# Patient Record
Sex: MALE | Race: WHITE | NOT HISPANIC OR LATINO | URBAN - METROPOLITAN AREA
[De-identification: names, ages, dates, MRNs, and addresses within clinical notes are randomized per-mention and may not be internally consistent; named-entity substitution may affect disease eponyms.]

---

## 2024-06-29 ENCOUNTER — EMERGENCY (EMERGENCY)
Facility: HOSPITAL | Age: 84
LOS: 0 days | Discharge: HOME | End: 2024-06-29
Attending: STUDENT IN AN ORGANIZED HEALTH CARE EDUCATION/TRAINING PROGRAM
Payer: MEDICARE

## 2024-06-29 VITALS
OXYGEN SATURATION: 97 % | HEART RATE: 84 BPM | SYSTOLIC BLOOD PRESSURE: 177 MMHG | RESPIRATION RATE: 20 BRPM | DIASTOLIC BLOOD PRESSURE: 101 MMHG | TEMPERATURE: 101 F | HEIGHT: 70 IN | WEIGHT: 195.11 LBS

## 2024-06-29 VITALS
RESPIRATION RATE: 18 BRPM | DIASTOLIC BLOOD PRESSURE: 84 MMHG | OXYGEN SATURATION: 95 % | SYSTOLIC BLOOD PRESSURE: 161 MMHG | HEART RATE: 82 BPM

## 2024-06-29 DIAGNOSIS — R05.9 COUGH, UNSPECIFIED: ICD-10-CM

## 2024-06-29 DIAGNOSIS — Z98.890 OTHER SPECIFIED POSTPROCEDURAL STATES: Chronic | ICD-10-CM

## 2024-06-29 DIAGNOSIS — M54.50 LOW BACK PAIN, UNSPECIFIED: ICD-10-CM

## 2024-06-29 DIAGNOSIS — Z95.5 PRESENCE OF CORONARY ANGIOPLASTY IMPLANT AND GRAFT: ICD-10-CM

## 2024-06-29 DIAGNOSIS — Z20.822 CONTACT WITH AND (SUSPECTED) EXPOSURE TO COVID-19: ICD-10-CM

## 2024-06-29 DIAGNOSIS — I25.10 ATHEROSCLEROTIC HEART DISEASE OF NATIVE CORONARY ARTERY WITHOUT ANGINA PECTORIS: ICD-10-CM

## 2024-06-29 DIAGNOSIS — E78.5 HYPERLIPIDEMIA, UNSPECIFIED: ICD-10-CM

## 2024-06-29 DIAGNOSIS — Z90.89 ACQUIRED ABSENCE OF OTHER ORGANS: Chronic | ICD-10-CM

## 2024-06-29 DIAGNOSIS — N39.0 URINARY TRACT INFECTION, SITE NOT SPECIFIED: ICD-10-CM

## 2024-06-29 LAB
ALBUMIN SERPL ELPH-MCNC: 3.7 G/DL — SIGNIFICANT CHANGE UP (ref 3.3–5)
ALP SERPL-CCNC: 93 U/L — SIGNIFICANT CHANGE UP (ref 40–120)
ALT FLD-CCNC: 49 U/L — SIGNIFICANT CHANGE UP (ref 12–78)
ANION GAP SERPL CALC-SCNC: 5 MMOL/L — SIGNIFICANT CHANGE UP (ref 5–17)
APPEARANCE UR: CLEAR — SIGNIFICANT CHANGE UP
AST SERPL-CCNC: 27 U/L — SIGNIFICANT CHANGE UP (ref 15–37)
BACTERIA # UR AUTO: NEGATIVE /HPF — SIGNIFICANT CHANGE UP
BASOPHILS # BLD AUTO: 0.05 K/UL — SIGNIFICANT CHANGE UP (ref 0–0.2)
BASOPHILS NFR BLD AUTO: 0.6 % — SIGNIFICANT CHANGE UP (ref 0–2)
BILIRUB SERPL-MCNC: 0.6 MG/DL — SIGNIFICANT CHANGE UP (ref 0.2–1.2)
BILIRUB UR-MCNC: NEGATIVE — SIGNIFICANT CHANGE UP
BUN SERPL-MCNC: 13 MG/DL — SIGNIFICANT CHANGE UP (ref 7–23)
CALCIUM SERPL-MCNC: 9.5 MG/DL — SIGNIFICANT CHANGE UP (ref 8.5–10.1)
CAST: 1 /LPF — SIGNIFICANT CHANGE UP (ref 0–4)
CHLORIDE SERPL-SCNC: 106 MMOL/L — SIGNIFICANT CHANGE UP (ref 96–108)
CO2 SERPL-SCNC: 27 MMOL/L — SIGNIFICANT CHANGE UP (ref 22–31)
COLOR SPEC: YELLOW — SIGNIFICANT CHANGE UP
CREAT SERPL-MCNC: 1.07 MG/DL — SIGNIFICANT CHANGE UP (ref 0.5–1.3)
DIFF PNL FLD: ABNORMAL
EGFR: 68 ML/MIN/1.73M2 — SIGNIFICANT CHANGE UP
EGFR: 68 ML/MIN/1.73M2 — SIGNIFICANT CHANGE UP
EOSINOPHIL # BLD AUTO: 0.26 K/UL — SIGNIFICANT CHANGE UP (ref 0–0.5)
EOSINOPHIL NFR BLD AUTO: 3.1 % — SIGNIFICANT CHANGE UP (ref 0–6)
FLUAV AG NPH QL: SIGNIFICANT CHANGE UP
FLUBV AG NPH QL: SIGNIFICANT CHANGE UP
GLUCOSE SERPL-MCNC: 124 MG/DL — HIGH (ref 70–99)
GLUCOSE UR QL: NEGATIVE MG/DL — SIGNIFICANT CHANGE UP
HCT VFR BLD CALC: 42.3 % — SIGNIFICANT CHANGE UP (ref 39–50)
HGB BLD-MCNC: 14.5 G/DL — SIGNIFICANT CHANGE UP (ref 13–17)
IMM GRANULOCYTES NFR BLD AUTO: 0.2 % — SIGNIFICANT CHANGE UP (ref 0–0.9)
KETONES UR-MCNC: NEGATIVE MG/DL — SIGNIFICANT CHANGE UP
LEUKOCYTE ESTERASE UR-ACNC: ABNORMAL
LYMPHOCYTES # BLD AUTO: 1.06 K/UL — SIGNIFICANT CHANGE UP (ref 1–3.3)
LYMPHOCYTES # BLD AUTO: 12.5 % — LOW (ref 13–44)
MCHC RBC-ENTMCNC: 29.4 PG — SIGNIFICANT CHANGE UP (ref 27–34)
MCHC RBC-ENTMCNC: 34.3 GM/DL — SIGNIFICANT CHANGE UP (ref 32–36)
MCV RBC AUTO: 85.8 FL — SIGNIFICANT CHANGE UP (ref 80–100)
MONOCYTES # BLD AUTO: 0.55 K/UL — SIGNIFICANT CHANGE UP (ref 0–0.9)
MONOCYTES NFR BLD AUTO: 6.5 % — SIGNIFICANT CHANGE UP (ref 2–14)
NEUTROPHILS # BLD AUTO: 6.51 K/UL — SIGNIFICANT CHANGE UP (ref 1.8–7.4)
NEUTROPHILS NFR BLD AUTO: 77.1 % — HIGH (ref 43–77)
NITRITE UR-MCNC: NEGATIVE — SIGNIFICANT CHANGE UP
PH UR: 7.5 — SIGNIFICANT CHANGE UP (ref 5–8)
PLATELET # BLD AUTO: 215 K/UL — SIGNIFICANT CHANGE UP (ref 150–400)
POTASSIUM SERPL-MCNC: 3.9 MMOL/L — SIGNIFICANT CHANGE UP (ref 3.5–5.3)
POTASSIUM SERPL-SCNC: 3.9 MMOL/L — SIGNIFICANT CHANGE UP (ref 3.5–5.3)
PROT SERPL-MCNC: 7.4 GM/DL — SIGNIFICANT CHANGE UP (ref 6–8.3)
PROT UR-MCNC: NEGATIVE MG/DL — SIGNIFICANT CHANGE UP
RBC # BLD: 4.93 M/UL — SIGNIFICANT CHANGE UP (ref 4.2–5.8)
RBC # FLD: 13.7 % — SIGNIFICANT CHANGE UP (ref 10.3–14.5)
RBC CASTS # UR COMP ASSIST: 29 /HPF — HIGH (ref 0–4)
RSV RNA NPH QL NAA+NON-PROBE: SIGNIFICANT CHANGE UP
SARS-COV-2 RNA SPEC QL NAA+PROBE: SIGNIFICANT CHANGE UP
SODIUM SERPL-SCNC: 138 MMOL/L — SIGNIFICANT CHANGE UP (ref 135–145)
SP GR SPEC: 1.01 — SIGNIFICANT CHANGE UP (ref 1–1.03)
SQUAMOUS # UR AUTO: 1 /HPF — SIGNIFICANT CHANGE UP (ref 0–5)
UROBILINOGEN FLD QL: 0.2 MG/DL — SIGNIFICANT CHANGE UP (ref 0.2–1)
WBC # BLD: 8.45 K/UL — SIGNIFICANT CHANGE UP (ref 3.8–10.5)
WBC # FLD AUTO: 8.45 K/UL — SIGNIFICANT CHANGE UP (ref 3.8–10.5)
WBC UR QL: 37 /HPF — HIGH (ref 0–5)

## 2024-06-29 PROCEDURE — 0241U: CPT

## 2024-06-29 PROCEDURE — 80053 COMPREHEN METABOLIC PANEL: CPT

## 2024-06-29 PROCEDURE — 74177 CT ABD & PELVIS W/CONTRAST: CPT | Mod: MC

## 2024-06-29 PROCEDURE — 93010 ELECTROCARDIOGRAM REPORT: CPT

## 2024-06-29 PROCEDURE — 93005 ELECTROCARDIOGRAM TRACING: CPT

## 2024-06-29 PROCEDURE — 71045 X-RAY EXAM CHEST 1 VIEW: CPT | Mod: 26

## 2024-06-29 PROCEDURE — 96374 THER/PROPH/DIAG INJ IV PUSH: CPT | Mod: XU

## 2024-06-29 PROCEDURE — 71045 X-RAY EXAM CHEST 1 VIEW: CPT

## 2024-06-29 PROCEDURE — 85025 COMPLETE CBC W/AUTO DIFF WBC: CPT

## 2024-06-29 PROCEDURE — 87086 URINE CULTURE/COLONY COUNT: CPT

## 2024-06-29 PROCEDURE — 36415 COLL VENOUS BLD VENIPUNCTURE: CPT

## 2024-06-29 PROCEDURE — 99285 EMERGENCY DEPT VISIT HI MDM: CPT

## 2024-06-29 PROCEDURE — 81001 URINALYSIS AUTO W/SCOPE: CPT

## 2024-06-29 PROCEDURE — 74177 CT ABD & PELVIS W/CONTRAST: CPT | Mod: 26,MC

## 2024-06-29 PROCEDURE — 99285 EMERGENCY DEPT VISIT HI MDM: CPT | Mod: 25

## 2024-06-29 RX ORDER — CEFPODOXIME PROXETIL 200 MG/1
1 TABLET, FILM COATED ORAL
Qty: 14 | Refills: 0
Start: 2024-06-29 | End: 2024-07-05

## 2024-06-29 RX ORDER — METHOCARBAMOL 500 MG/1
750 TABLET, FILM COATED ORAL ONCE
Refills: 0 | Status: COMPLETED | OUTPATIENT
Start: 2024-06-29 | End: 2024-06-29

## 2024-06-29 RX ORDER — CEFTRIAXONE 500 MG/1
1000 INJECTION, POWDER, FOR SOLUTION INTRAMUSCULAR; INTRAVENOUS ONCE
Refills: 0 | Status: COMPLETED | OUTPATIENT
Start: 2024-06-29 | End: 2024-06-29

## 2024-06-29 RX ORDER — ACETAMINOPHEN 500 MG/5ML
975 LIQUID (ML) ORAL ONCE
Refills: 0 | Status: COMPLETED | OUTPATIENT
Start: 2024-06-29 | End: 2024-06-29

## 2024-06-29 RX ADMIN — CEFTRIAXONE 1000 MILLIGRAM(S): 500 INJECTION, POWDER, FOR SOLUTION INTRAMUSCULAR; INTRAVENOUS at 11:06

## 2024-06-29 RX ADMIN — METHOCARBAMOL 750 MILLIGRAM(S): 500 TABLET, FILM COATED ORAL at 09:06

## 2024-06-29 RX ADMIN — Medication 975 MILLIGRAM(S): at 09:05

## 2024-06-30 LAB
CULTURE RESULTS: SIGNIFICANT CHANGE UP
SPECIMEN SOURCE: SIGNIFICANT CHANGE UP

## 2024-11-05 ENCOUNTER — OUTPATIENT (OUTPATIENT)
Dept: OUTPATIENT SERVICES | Facility: HOSPITAL | Age: 84
LOS: 1 days | Discharge: HOME | End: 2024-11-05
Payer: MEDICARE

## 2024-11-05 VITALS
HEIGHT: 70 IN | DIASTOLIC BLOOD PRESSURE: 59 MMHG | WEIGHT: 192.68 LBS | SYSTOLIC BLOOD PRESSURE: 109 MMHG | OXYGEN SATURATION: 96 % | HEART RATE: 71 BPM | TEMPERATURE: 98 F | RESPIRATION RATE: 16 BRPM

## 2024-11-05 DIAGNOSIS — Z98.890 OTHER SPECIFIED POSTPROCEDURAL STATES: Chronic | ICD-10-CM

## 2024-11-05 DIAGNOSIS — M16.11 UNILATERAL PRIMARY OSTEOARTHRITIS, RIGHT HIP: ICD-10-CM

## 2024-11-05 DIAGNOSIS — Z01.818 ENCOUNTER FOR OTHER PREPROCEDURAL EXAMINATION: ICD-10-CM

## 2024-11-05 DIAGNOSIS — F01.50 VASCULAR DEMENTIA, UNSPECIFIED SEVERITY, WITHOUT BEHAVIORAL DISTURBANCE, PSYCHOTIC DISTURBANCE, MOOD DISTURBANCE, AND ANXIETY: ICD-10-CM

## 2024-11-05 DIAGNOSIS — Z90.89 ACQUIRED ABSENCE OF OTHER ORGANS: Chronic | ICD-10-CM

## 2024-11-05 DIAGNOSIS — I25.10 ATHEROSCLEROTIC HEART DISEASE OF NATIVE CORONARY ARTERY WITHOUT ANGINA PECTORIS: ICD-10-CM

## 2024-11-05 DIAGNOSIS — I10 ESSENTIAL (PRIMARY) HYPERTENSION: ICD-10-CM

## 2024-11-05 LAB
ALBUMIN SERPL ELPH-MCNC: 3.8 G/DL — SIGNIFICANT CHANGE UP (ref 3.3–5)
ALP SERPL-CCNC: 89 U/L — SIGNIFICANT CHANGE UP (ref 40–120)
ALT FLD-CCNC: 31 U/L — SIGNIFICANT CHANGE UP (ref 12–78)
ANION GAP SERPL CALC-SCNC: 10 MMOL/L — SIGNIFICANT CHANGE UP (ref 5–17)
APTT BLD: 36.2 SEC — HIGH (ref 24.5–35.6)
AST SERPL-CCNC: 19 U/L — SIGNIFICANT CHANGE UP (ref 15–37)
BASOPHILS # BLD AUTO: 0.07 K/UL — SIGNIFICANT CHANGE UP (ref 0–0.2)
BASOPHILS NFR BLD AUTO: 0.9 % — SIGNIFICANT CHANGE UP (ref 0–2)
BILIRUB SERPL-MCNC: 0.5 MG/DL — SIGNIFICANT CHANGE UP (ref 0.2–1.2)
BLD GP AB SCN SERPL QL: SIGNIFICANT CHANGE UP
BUN SERPL-MCNC: 16 MG/DL — SIGNIFICANT CHANGE UP (ref 7–23)
CALCIUM SERPL-MCNC: 9.2 MG/DL — SIGNIFICANT CHANGE UP (ref 8.5–10.1)
CHLORIDE SERPL-SCNC: 102 MMOL/L — SIGNIFICANT CHANGE UP (ref 96–108)
CO2 SERPL-SCNC: 27 MMOL/L — SIGNIFICANT CHANGE UP (ref 22–31)
CREAT SERPL-MCNC: 0.93 MG/DL — SIGNIFICANT CHANGE UP (ref 0.5–1.3)
EGFR: 81 ML/MIN/1.73M2 — SIGNIFICANT CHANGE UP
EOSINOPHIL # BLD AUTO: 0.25 K/UL — SIGNIFICANT CHANGE UP (ref 0–0.5)
EOSINOPHIL NFR BLD AUTO: 3.2 % — SIGNIFICANT CHANGE UP (ref 0–6)
GLUCOSE SERPL-MCNC: 102 MG/DL — HIGH (ref 70–99)
HCT VFR BLD CALC: 41.8 % — SIGNIFICANT CHANGE UP (ref 39–50)
HGB BLD-MCNC: 14.1 G/DL — SIGNIFICANT CHANGE UP (ref 13–17)
IMM GRANULOCYTES NFR BLD AUTO: 0.3 % — SIGNIFICANT CHANGE UP (ref 0–0.9)
INR BLD: 1.13 RATIO — SIGNIFICANT CHANGE UP (ref 0.85–1.16)
LYMPHOCYTES # BLD AUTO: 1.14 K/UL — SIGNIFICANT CHANGE UP (ref 1–3.3)
LYMPHOCYTES # BLD AUTO: 14.7 % — SIGNIFICANT CHANGE UP (ref 13–44)
MCHC RBC-ENTMCNC: 29 PG — SIGNIFICANT CHANGE UP (ref 27–34)
MCHC RBC-ENTMCNC: 33.7 G/DL — SIGNIFICANT CHANGE UP (ref 32–36)
MCV RBC AUTO: 86 FL — SIGNIFICANT CHANGE UP (ref 80–100)
MONOCYTES # BLD AUTO: 0.54 K/UL — SIGNIFICANT CHANGE UP (ref 0–0.9)
MONOCYTES NFR BLD AUTO: 6.9 % — SIGNIFICANT CHANGE UP (ref 2–14)
NEUTROPHILS # BLD AUTO: 5.76 K/UL — SIGNIFICANT CHANGE UP (ref 1.8–7.4)
NEUTROPHILS NFR BLD AUTO: 74 % — SIGNIFICANT CHANGE UP (ref 43–77)
NRBC # BLD: 0 /100 WBCS — SIGNIFICANT CHANGE UP (ref 0–0)
PLATELET # BLD AUTO: 234 K/UL — SIGNIFICANT CHANGE UP (ref 150–400)
POTASSIUM SERPL-MCNC: 4.1 MMOL/L — SIGNIFICANT CHANGE UP (ref 3.5–5.3)
POTASSIUM SERPL-SCNC: 4.1 MMOL/L — SIGNIFICANT CHANGE UP (ref 3.5–5.3)
PROT SERPL-MCNC: 7.5 GM/DL — SIGNIFICANT CHANGE UP (ref 6–8.3)
PROTHROM AB SERPL-ACNC: 13.1 SEC — SIGNIFICANT CHANGE UP (ref 9.9–13.4)
RBC # BLD: 4.86 M/UL — SIGNIFICANT CHANGE UP (ref 4.2–5.8)
RBC # FLD: 14 % — SIGNIFICANT CHANGE UP (ref 10.3–14.5)
SODIUM SERPL-SCNC: 139 MMOL/L — SIGNIFICANT CHANGE UP (ref 135–145)
WBC # BLD: 7.78 K/UL — SIGNIFICANT CHANGE UP (ref 3.8–10.5)
WBC # FLD AUTO: 7.78 K/UL — SIGNIFICANT CHANGE UP (ref 3.8–10.5)

## 2024-11-05 PROCEDURE — 93010 ELECTROCARDIOGRAM REPORT: CPT

## 2024-11-05 NOTE — H&P PST ADULT - SKIN
warm and dry Prednisone Counseling:  I discussed with the patient the risks of prolonged use of prednisone including but not limited to weight gain, insomnia, osteoporosis, mood changes, diabetes, susceptibility to infection, glaucoma and high blood pressure.  In cases where prednisone use is prolonged, patients should be monitored with blood pressure checks, serum glucose levels and an eye exam.  Additionally, the patient may need to be placed on GI prophylaxis, PCP prophylaxis, and calcium and vitamin D supplementation and/or a bisphosphonate.  The patient verbalized understanding of the proper use and the possible adverse effects of prednisone.  All of the patient's questions and concerns were addressed.

## 2024-11-05 NOTE — H&P PST ADULT - NSICDXPASTMEDICALHX_GEN_ALL_CORE_FT
PAST MEDICAL HISTORY:  CAD (coronary artery disease)     Depression     Glaucoma     History of BPH     HLD (hyperlipidemia)      negative

## 2024-11-05 NOTE — H&P PST ADULT - HISTORY OF PRESENT ILLNESS
Department of Cardiology                                                                  [facility]/[place] [address] [place]                                                            Telephone: [phone]. Fax:[phone]                                                                                    [facility]     Chief complaint:    Patient is a 83y old  Male who presents with a chief complaint of abnormal nuclear stress test, had cardiac catheterization 1/2/23 and intervention to the mid RCA, he is to undergo staged intervention to LAD.    ..........,82 yo male who is a resident of [facility],  with history of HTN, HLD,  MI, CAD, angioplasty 1989, mild AI, depression, dementia, [place], remote hx of spinal fusion (patient has screws in spine), severe arthritis, right hip surgery now on hold, ambulates with walker at baseline, due to abnormal NST, abnormal nuclear stress test on 12/23 with anterior and apical ischemia.      Symptoms:        Angina (Class):        Ischemic Symptoms: none    Heart Failure:        Systolic/Diastolic/Combined:        NYHA Class (within 2 weeks):     Assessment of LVEF:        EF: 74%       Assessed by: echo       Date: 11/29/23    Prior Cardiac Interventions:  Prior EP Procedures:    Prior IC Procedures:  ·The coronary circulation is right dominant.    LM   ·Left main artery: The segment is mildly calcified. Angiography shows mild atherosclerosis.  LAD   ·Proximal left anterior descending: The segment is severely calcified. There is an 80 % stenosis.  CX   ·Proximal circumflex: There is a 25 % stenosis.    RCA   ·Proximal right coronary artery: The segment is moderately calcified. There is a 20 % stenosis.   ·Mid right coronary artery: This is an ulcerated lesion. There is an 85 %stenosis. Intravascular ultrasound was performed. Imaging shows minimal cross sectional area of 3.6 mm2. Based on the results, the lesion was judged to be significant and an intervention was performed with an LUIS MANUEL FRONTIER 4.0 X 38MM ANJU postdilated with an NC SAPPHIRE II 4.5 X 15MM balloon.  Left Heart Cath   ·Left ventricular function was assessed and demonstrates normal LV wall motion. Ejection fraction is 55%.   Valves   ·Aortic Valve: There is no aortic valve stenosis.    ·Mitral Valve: The mitral valve exhibits no regurgitation.     Prior CTS:      < from: Cardiac Catheterization (01.02.24 @ 11:23) >  Conclusions:   Normal LVEF     80% heavily calcified stenosis of the proximal LAD   Mild disease of the midLCX   85% ulcerated stenosis of the mid RCA successfully stented   Recommendations:   Aspirin and Plavix   Patient to return for PCI of the LAD in 2-4 weeks.   Acute complication:    No complications       < end of copied text >         Noninvasive Testing:   Stress Test: Date: 12/3/23       Protocol: regadenoson       Symptoms: none       EKG Changes: none       Myocardial Imaging: abnormal, moderate zone of anterior and apical ischemia.       Risk Assessment:     Echo:  normal LV size and function, no regional wall motion abnormalities, trace MR, AI    Antianginal Therapies:        Beta Blockers:         Calcium Channel Blockers: anlodipine       Long Acting Nitrates:        Ranexa:     Associated Risk Factors:        Cerebrovascular Disease: N/A       Chronic Lung Disease: N/A       Peripheral Arterial Disease: N/A       Chronic Kidney Disease (if yes, what is GFR): N/A       Uncontrolled Diabetes (if yes, what is HgbA1C or FBS): N/A       Poorly Controlled Hypertension (if yes, what is SBP): N/A       Morbid Obesity (if yes, what is BMI): N/A       History of Recent Ventricular Arrhythmia: N/A       Inability to Ambulate Safely: N/A       Need for Therapeutic Anticoagulation: N/A       Antiplatelet or Contrast Allergy: N/A             Antiarrhythmic Therapies:      Anticoagulation Therapies:      	  ROS: as stated above, otherwise negative      	    LABS:                          13.9   9.00  )-----------( 227      ( 30 Jan 2024 11:34 )             42.7     01-30    139  |  102  |  16.7  ----------------------------<  154<H>  4.0   |  28.0  |  0.90    Ca    9.3      30 Jan 2024 11:34    TPro  7.3  /  Alb  4.0  /  TBili  0.6  /  DBili  x   /  AST  12  /  ALT  12  /  AlkPhos  87  01-30    LIVER FUNCTIONS - ( 30 Jan 2024 11:34 )  Alb: 4.0 g/dL / Pro: 7.3 g/dL / ALK PHOS: 87 U/L / ALT: 12 U/L / AST: 12 U/L / GGT: x           PT/INR - ( 30 Jan 2024 11:34 )   PT: 13.3 sec;   INR: 1.20 ratio                                                                     84M who is a resident of [facility],  [facility] HTN, HLD,  MI, CAD (s/p ptca w/stent x2), angioplasty 1989, mild AI, depression, dementia, [place], here for PST for scheduled Anterior right hip arthroplasty with Dr. Fajardo on 11-

## 2024-11-05 NOTE — OCCUPATIONAL THERAPY INITIAL EVALUATION ADULT - PERTINENT HX OF CURRENT PROBLEM, REHAB EVAL
R hip OA which impacts pts ability to perform functional tasks/transfers and mobility. Pt is scheduled for R THR on 11/18/24.

## 2024-11-05 NOTE — H&P PST ADULT - ASSESSMENT
84M who is a resident of [facility] HTN, HLD,  MI, CAD (s/p ptca w/stent x2), angioplasty , mild AI, depression, dementia, [place], here for PST for scheduled Anterior right hip arthroplasty with Dr. Fajardo on 2024  CAPRINI SCORE    AGE RELATED RISK FACTORS                                                             [ ] Age 41-60 years                                            (1 Point)  [ ] Age: 61-74 years                                           (2 Points)                 [ x] Age= 75 years                                                (3 Points)             DISEASE RELATED RISK FACTORS                                                       [x ] Edema in the lower extremities                 (1 Point)                     [ ] Varicose veins                                               (1 Point)                                 [x ] BMI > 25 Kg/m2                                            (1 Point)                                  [ ] Serious infection (ie PNA)                            (1 Point)                     [ ] Lung disease ( COPD, Emphysema)            (1 Point)                                                                          [ ] Acute myocardial infarction                         (1 Point)                  [ ] Congestive heart failure (in the previous month)  (1 Point)         [ ] Inflammatory bowel disease                            (1 Point)                  [ ] Central venous access, PICC or Port               (2 points)       (within the last month)                                                                [ ] Stroke (in the previous month)                        (5 Points)    [ ] Previous or present malignancy                       (2 points)                                                                                                                                                         HEMATOLOGY RELATED FACTORS                                                         [ ] Prior episodes of VTE                                     (3 Points)                     [ ] Positive family history for VTE                      (3 Points)                  [ ] Prothrombin 59871 A                                     (3 Points)                     [ ] Factor V Leiden                                                (3 Points)                        [ ] Lupus anticoagulants                                      (3 Points)                                                           [ ] Anticardiolipin antibodies                              (3 Points)                                                       [ ] High homocysteine in the blood                   (3 Points)                                             [ ] Other congenital or acquired thrombophilia      (3 Points)                                                [ ] Heparin induced thrombocytopenia                  (3 Points)                                        MOBILITY RELATED FACTORS  [ ] Bed rest                                                         (1 Point)  [ ] Plaster cast                                                    (2 points)  [ ] Bed bound for more than 72 hours           (2 Points)    GENDER SPECIFIC FACTORS  [ ] Pregnancy or had a baby within the last month   (1 Point)  [ ] Post-partum < 6 weeks                                   (1 Point)  [ ] Hormonal therapy  or oral contraception   (1 Point)  [ ] History of pregnancy complications              (1 point)  [ ] Unexplained or recurrent              (1 Point)    OTHER RISK FACTORS                                           (1 Point)  [ ] BMI >40, smoking, diabetes requiring insulin, chemotherapy  blood transfusions and length of surgery over 2 hours    SURGERY RELATED RISK FACTORS  [ ]  Section within the last month     (1 Point)  [ ] Minor surgery                                                  (1 Point)  [ ] Arthroscopic surgery                                       (2 Points)  [ ] Planned major surgery lasting more            (2 Points)      than 45 minutes     [x ] Elective hip or knee joint replacement       (5 points)       surgery                                                TRAUMA RELATED RISK FACTORS  [ ] Fracture of the hip, pelvis, or leg                       (5 Points)  [ ] Spinal cord injury resulting in paralysis             (5 points)       (in the previous month)    [ ] Paralysis  (less than 1 month)                             (5 Points)  [ ] Multiple Trauma within 1 month                        (5 Points)    Total Score [   10     ]    Caprini Score 0-2: Low Risk, NO VTE prophylaxis required for most patients, encourage ambulation  Caprini Score 3-6: Moderate Risk , pharmacologic VTE prophylaxis is indicated for most patients (in the absence of contraindications)  Caprini Score Greater than or =7: High risk, pharmocologic VTE prophylaxis indicated for most patients (in the absence of contraindications)

## 2024-11-05 NOTE — H&P PST ADULT - PROBLEM SELECTOR PLAN 3
s/p ptca w/stent x2 on Plavix  Plavix to be held 7days prior to surgery instructed to confirm with cardiologist  cardiac clearance

## 2024-11-05 NOTE — H&P PST ADULT - PROBLEM SELECTOR PLAN 1
anterior right hip arthroplasty'  labs - cbc,pt/ptt,bmp,t&s,nose cx,ekg  M/C required  cardiac clearance  preop 3 day hibiclens instruction reviewed and given .instructed on if  nose cx positive use mupuricin 5 days and checklist given  take routine meds DOS with sips of water. avoid NSAID and OTC supplements. verbalized understanding  information on proper nutrition , increase protein and better food choices provided in packet   ensure clear given  Anesthesiologist to review PST labs, EKG, required clearances and optimization for surgery.

## 2024-11-06 LAB
A1C WITH ESTIMATED AVERAGE GLUCOSE RESULT: 5.2 % — SIGNIFICANT CHANGE UP (ref 4–5.6)
ESTIMATED AVERAGE GLUCOSE: 103 MG/DL — SIGNIFICANT CHANGE UP (ref 68–114)
MRSA PCR RESULT.: DETECTED
S AUREUS DNA NOSE QL NAA+PROBE: DETECTED
VIT D25+D1,25 OH+D1,25 PNL SERPL-MCNC: 58.6 PG/ML — SIGNIFICANT CHANGE UP (ref 19.9–79.3)

## 2024-11-06 RX ORDER — MUPIROCIN 20 MG/G
1 OINTMENT TOPICAL
Qty: 1 | Refills: 0
Start: 2024-11-06 | End: 2024-11-10

## 2024-11-06 RX ORDER — SODIUM CHLORIDE 9 MG/ML
3 INJECTION, SOLUTION INTRAMUSCULAR; INTRAVENOUS; SUBCUTANEOUS EVERY 8 HOURS
Refills: 0 | Status: DISCONTINUED | OUTPATIENT
Start: 2024-11-18 | End: 2024-11-18

## 2024-11-18 ENCOUNTER — TRANSCRIPTION ENCOUNTER (OUTPATIENT)
Age: 84
End: 2024-11-18

## 2024-11-18 ENCOUNTER — INPATIENT (INPATIENT)
Facility: HOSPITAL | Age: 84
LOS: 2 days | Discharge: NURSING, REHAB OR LONG-TERM CARE | End: 2024-11-21
Attending: STUDENT IN AN ORGANIZED HEALTH CARE EDUCATION/TRAINING PROGRAM | Admitting: STUDENT IN AN ORGANIZED HEALTH CARE EDUCATION/TRAINING PROGRAM
Payer: MEDICARE

## 2024-11-18 ENCOUNTER — APPOINTMENT (OUTPATIENT)
Dept: ORTHOPEDIC SURGERY | Facility: HOSPITAL | Age: 84
End: 2024-11-18
Payer: MEDICARE

## 2024-11-18 VITALS
DIASTOLIC BLOOD PRESSURE: 77 MMHG | OXYGEN SATURATION: 96 % | WEIGHT: 192.02 LBS | HEART RATE: 70 BPM | HEIGHT: 69 IN | SYSTOLIC BLOOD PRESSURE: 132 MMHG | TEMPERATURE: 98 F | RESPIRATION RATE: 14 BRPM

## 2024-11-18 DIAGNOSIS — Z98.890 OTHER SPECIFIED POSTPROCEDURAL STATES: Chronic | ICD-10-CM

## 2024-11-18 DIAGNOSIS — Z90.89 ACQUIRED ABSENCE OF OTHER ORGANS: Chronic | ICD-10-CM

## 2024-11-18 LAB
ANION GAP SERPL CALC-SCNC: 5 MMOL/L — SIGNIFICANT CHANGE UP (ref 5–17)
BLD GP AB SCN SERPL QL: SIGNIFICANT CHANGE UP
BUN SERPL-MCNC: 12 MG/DL — SIGNIFICANT CHANGE UP (ref 7–23)
CALCIUM SERPL-MCNC: 8.5 MG/DL — SIGNIFICANT CHANGE UP (ref 8.5–10.1)
CHLORIDE SERPL-SCNC: 105 MMOL/L — SIGNIFICANT CHANGE UP (ref 96–108)
CO2 SERPL-SCNC: 26 MMOL/L — SIGNIFICANT CHANGE UP (ref 22–31)
CREAT SERPL-MCNC: 0.94 MG/DL — SIGNIFICANT CHANGE UP (ref 0.5–1.3)
EGFR: 80 ML/MIN/1.73M2 — SIGNIFICANT CHANGE UP
GLUCOSE BLDC GLUCOMTR-MCNC: 134 MG/DL — HIGH (ref 70–99)
GLUCOSE SERPL-MCNC: 154 MG/DL — HIGH (ref 70–99)
HCT VFR BLD CALC: 37.7 % — LOW (ref 39–50)
HGB BLD-MCNC: 12.6 G/DL — LOW (ref 13–17)
MCHC RBC-ENTMCNC: 28.9 PG — SIGNIFICANT CHANGE UP (ref 27–34)
MCHC RBC-ENTMCNC: 33.4 G/DL — SIGNIFICANT CHANGE UP (ref 32–36)
MCV RBC AUTO: 86.5 FL — SIGNIFICANT CHANGE UP (ref 80–100)
NRBC # BLD: 0 /100 WBCS — SIGNIFICANT CHANGE UP (ref 0–0)
PLATELET # BLD AUTO: 195 K/UL — SIGNIFICANT CHANGE UP (ref 150–400)
POTASSIUM SERPL-MCNC: 3.9 MMOL/L — SIGNIFICANT CHANGE UP (ref 3.5–5.3)
POTASSIUM SERPL-SCNC: 3.9 MMOL/L — SIGNIFICANT CHANGE UP (ref 3.5–5.3)
RBC # BLD: 4.36 M/UL — SIGNIFICANT CHANGE UP (ref 4.2–5.8)
RBC # FLD: 13.6 % — SIGNIFICANT CHANGE UP (ref 10.3–14.5)
SODIUM SERPL-SCNC: 136 MMOL/L — SIGNIFICANT CHANGE UP (ref 135–145)
WBC # BLD: 9.62 K/UL — SIGNIFICANT CHANGE UP (ref 3.8–10.5)
WBC # FLD AUTO: 9.62 K/UL — SIGNIFICANT CHANGE UP (ref 3.8–10.5)

## 2024-11-18 PROCEDURE — 27130 TOTAL HIP ARTHROPLASTY: CPT | Mod: AS,RT

## 2024-11-18 PROCEDURE — 73501 X-RAY EXAM HIP UNI 1 VIEW: CPT | Mod: 26,RT

## 2024-11-18 PROCEDURE — 27130 TOTAL HIP ARTHROPLASTY: CPT | Mod: RT

## 2024-11-18 DEVICE — PINNACLE ALTRX NEUTRAL36 X 56: Type: IMPLANTABLE DEVICE | Site: RIGHT HIP | Status: FUNCTIONAL

## 2024-11-18 DEVICE — HEAD FEM CERAMIC 12/14 36MM PLUS 5: Type: IMPLANTABLE DEVICE | Site: RIGHT HIP | Status: FUNCTIONAL

## 2024-11-18 DEVICE — SHELL ACET PINNACLE SECTOR W/ GRIPTION 56MM: Type: IMPLANTABLE DEVICE | Site: RIGHT HIP | Status: FUNCTIONAL

## 2024-11-18 DEVICE — STEM FEM ACTIS HIGH COLLAR SZ 7: Type: IMPLANTABLE DEVICE | Site: RIGHT HIP | Status: FUNCTIONAL

## 2024-11-18 RX ORDER — OXYCODONE HYDROCHLORIDE 30 MG/1
5 TABLET ORAL EVERY 4 HOURS
Refills: 0 | Status: DISCONTINUED | OUTPATIENT
Start: 2024-11-18 | End: 2024-11-19

## 2024-11-18 RX ORDER — ACETAMINOPHEN, DIPHENHYDRAMINE HCL, PHENYLEPHRINE HCL 325; 25; 5 MG/1; MG/1; MG/1
3 TABLET ORAL AT BEDTIME
Refills: 0 | Status: DISCONTINUED | OUTPATIENT
Start: 2024-11-18 | End: 2024-11-21

## 2024-11-18 RX ORDER — ACETAMINOPHEN 500MG 500 MG/1
1000 TABLET, COATED ORAL ONCE
Refills: 0 | Status: COMPLETED | OUTPATIENT
Start: 2024-11-18 | End: 2024-11-19

## 2024-11-18 RX ORDER — ONDANSETRON HYDROCHLORIDE 4 MG/1
4 TABLET, FILM COATED ORAL ONCE
Refills: 0 | Status: DISCONTINUED | OUTPATIENT
Start: 2024-11-18 | End: 2024-11-18

## 2024-11-18 RX ORDER — CELECOXIB 200 MG/1
200 CAPSULE ORAL EVERY 12 HOURS
Refills: 0 | Status: DISCONTINUED | OUTPATIENT
Start: 2024-11-19 | End: 2024-11-21

## 2024-11-18 RX ORDER — OXYCODONE HYDROCHLORIDE 30 MG/1
10 TABLET ORAL EVERY 4 HOURS
Refills: 0 | Status: DISCONTINUED | OUTPATIENT
Start: 2024-11-18 | End: 2024-11-19

## 2024-11-18 RX ORDER — TAMSULOSIN HYDROCHLORIDE 0.4 MG/1
0.4 CAPSULE ORAL AT BEDTIME
Refills: 0 | Status: DISCONTINUED | OUTPATIENT
Start: 2024-11-18 | End: 2024-11-21

## 2024-11-18 RX ORDER — POTASSIUM CITRATE 10 MEQ/1
15 TABLET, EXTENDED RELEASE ORAL
Refills: 0 | DISCHARGE

## 2024-11-18 RX ORDER — CEFAZOLIN SODIUM 10 G
2000 VIAL (EA) INJECTION EVERY 8 HOURS
Refills: 0 | Status: COMPLETED | OUTPATIENT
Start: 2024-11-18 | End: 2024-11-19

## 2024-11-18 RX ORDER — ACETAMINOPHEN 500MG 500 MG/1
650 TABLET, COATED ORAL ONCE
Refills: 0 | Status: COMPLETED | OUTPATIENT
Start: 2024-11-18 | End: 2024-11-18

## 2024-11-18 RX ORDER — DESMOPRESSIN ACETATE 4 UG/ML
0.1 SOLUTION INTRAVENOUS AT BEDTIME
Refills: 0 | Status: DISCONTINUED | OUTPATIENT
Start: 2024-11-18 | End: 2024-11-21

## 2024-11-18 RX ORDER — ACETAMINOPHEN 500MG 500 MG/1
650 TABLET, COATED ORAL EVERY 6 HOURS
Refills: 0 | Status: COMPLETED | OUTPATIENT
Start: 2024-11-18 | End: 2024-11-21

## 2024-11-18 RX ORDER — CELECOXIB 200 MG/1
200 CAPSULE ORAL ONCE
Refills: 0 | Status: COMPLETED | OUTPATIENT
Start: 2024-11-18 | End: 2024-11-18

## 2024-11-18 RX ORDER — PANTOPRAZOLE SODIUM 40 MG/1
40 TABLET, DELAYED RELEASE ORAL
Refills: 0 | Status: DISCONTINUED | OUTPATIENT
Start: 2024-11-18 | End: 2024-11-21

## 2024-11-18 RX ORDER — HYDROMORPHONE HYDROCHLORIDE 2 MG/1
0.5 TABLET ORAL
Refills: 0 | Status: DISCONTINUED | OUTPATIENT
Start: 2024-11-18 | End: 2024-11-21

## 2024-11-18 RX ORDER — SENNOSIDES 8.6 MG
2 TABLET ORAL AT BEDTIME
Refills: 0 | Status: DISCONTINUED | OUTPATIENT
Start: 2024-11-18 | End: 2024-11-21

## 2024-11-18 RX ORDER — CLOPIDOGREL 75 MG/1
75 TABLET, FILM COATED ORAL DAILY
Refills: 0 | Status: DISCONTINUED | OUTPATIENT
Start: 2024-11-19 | End: 2024-11-21

## 2024-11-18 RX ORDER — ONDANSETRON HYDROCHLORIDE 4 MG/1
4 TABLET, FILM COATED ORAL EVERY 6 HOURS
Refills: 0 | Status: DISCONTINUED | OUTPATIENT
Start: 2024-11-18 | End: 2024-11-21

## 2024-11-18 RX ORDER — FENTANYL 12 UG/H
25 PATCH, EXTENDED RELEASE TRANSDERMAL
Refills: 0 | Status: DISCONTINUED | OUTPATIENT
Start: 2024-11-18 | End: 2024-11-18

## 2024-11-18 RX ORDER — 0.9 % SODIUM CHLORIDE 0.9 %
1000 INTRAVENOUS SOLUTION INTRAVENOUS
Refills: 0 | Status: DISCONTINUED | OUTPATIENT
Start: 2024-11-18 | End: 2024-11-18

## 2024-11-18 RX ORDER — CYANOCOBALAMIN/FOLIC AC/VIT B6 1-2.2-25MG
1 TABLET ORAL DAILY
Refills: 0 | Status: DISCONTINUED | OUTPATIENT
Start: 2024-11-18 | End: 2024-11-21

## 2024-11-18 RX ORDER — CETIRIZINE HYDROCHLORIDE 10 MG/1
10 TABLET ORAL DAILY
Refills: 0 | Status: DISCONTINUED | OUTPATIENT
Start: 2024-11-18 | End: 2024-11-21

## 2024-11-18 RX ORDER — DONEPEZIL HYDROCHLORIDE 5 MG/1
10 TABLET, FILM COATED ORAL AT BEDTIME
Refills: 0 | Status: DISCONTINUED | OUTPATIENT
Start: 2024-11-18 | End: 2024-11-21

## 2024-11-18 RX ORDER — MEMANTINE HYDROCHLORIDE 14 MG/1
10 CAPSULE, EXTENDED RELEASE ORAL
Refills: 0 | Status: DISCONTINUED | OUTPATIENT
Start: 2024-11-18 | End: 2024-11-21

## 2024-11-18 RX ORDER — POLYETHYLENE GLYCOL 3350 17 G/17G
17 POWDER, FOR SOLUTION ORAL AT BEDTIME
Refills: 0 | Status: DISCONTINUED | OUTPATIENT
Start: 2024-11-18 | End: 2024-11-21

## 2024-11-18 RX ORDER — AMLODIPINE BESYLATE 10 MG/1
5 TABLET ORAL DAILY
Refills: 0 | Status: DISCONTINUED | OUTPATIENT
Start: 2024-11-18 | End: 2024-11-21

## 2024-11-18 RX ORDER — VENLAFAXINE HYDROCHLORIDE 75 MG/1
75 CAPSULE, EXTENDED RELEASE ORAL DAILY
Refills: 0 | Status: DISCONTINUED | OUTPATIENT
Start: 2024-11-18 | End: 2024-11-21

## 2024-11-18 RX ORDER — VANCOMYCIN HCL 900 MCG/MG
1250 POWDER (GRAM) MISCELLANEOUS ONCE
Refills: 0 | Status: COMPLETED | OUTPATIENT
Start: 2024-11-18 | End: 2024-11-18

## 2024-11-18 RX ORDER — 0.9 % SODIUM CHLORIDE 0.9 %
1000 INTRAVENOUS SOLUTION INTRAVENOUS
Refills: 0 | Status: DISCONTINUED | OUTPATIENT
Start: 2024-11-18 | End: 2024-11-21

## 2024-11-18 RX ORDER — DEXAMETHASONE 1.5 MG/1
10 TABLET ORAL ONCE
Refills: 0 | Status: COMPLETED | OUTPATIENT
Start: 2024-11-19 | End: 2024-11-19

## 2024-11-18 RX ORDER — MULTIVIT WITH MINERALS/LUTEIN
500 TABLET ORAL
Refills: 0 | Status: DISCONTINUED | OUTPATIENT
Start: 2024-11-18 | End: 2024-11-21

## 2024-11-18 RX ADMIN — CELECOXIB 200 MILLIGRAM(S): 200 CAPSULE ORAL at 11:19

## 2024-11-18 RX ADMIN — Medication 115 MILLILITER(S): at 20:26

## 2024-11-18 RX ADMIN — DESMOPRESSIN ACETATE 0.1 MILLIGRAM(S): 4 SOLUTION INTRAVENOUS at 21:45

## 2024-11-18 RX ADMIN — Medication 100 MILLIGRAM(S): at 20:25

## 2024-11-18 RX ADMIN — Medication 500 MILLIGRAM(S): at 18:19

## 2024-11-18 RX ADMIN — POLYETHYLENE GLYCOL 3350 17 GRAM(S): 17 POWDER, FOR SOLUTION ORAL at 21:45

## 2024-11-18 RX ADMIN — Medication 10 MILLIGRAM(S): at 21:45

## 2024-11-18 RX ADMIN — CELECOXIB 200 MILLIGRAM(S): 200 CAPSULE ORAL at 10:54

## 2024-11-18 RX ADMIN — ACETAMINOPHEN 500MG 650 MILLIGRAM(S): 500 TABLET, COATED ORAL at 11:19

## 2024-11-18 RX ADMIN — ACETAMINOPHEN 500MG 650 MILLIGRAM(S): 500 TABLET, COATED ORAL at 10:55

## 2024-11-18 RX ADMIN — Medication 2 TABLET(S): at 21:44

## 2024-11-18 RX ADMIN — ACETAMINOPHEN, DIPHENHYDRAMINE HCL, PHENYLEPHRINE HCL 3 MILLIGRAM(S): 325; 25; 5 TABLET ORAL at 22:51

## 2024-11-18 RX ADMIN — ACETAMINOPHEN 500MG 650 MILLIGRAM(S): 500 TABLET, COATED ORAL at 18:20

## 2024-11-18 RX ADMIN — Medication 166.67 MILLIGRAM(S): at 10:55

## 2024-11-18 RX ADMIN — ACETAMINOPHEN 500MG 650 MILLIGRAM(S): 500 TABLET, COATED ORAL at 19:01

## 2024-11-18 RX ADMIN — OXYCODONE HYDROCHLORIDE 5 MILLIGRAM(S): 30 TABLET ORAL at 22:51

## 2024-11-18 RX ADMIN — DONEPEZIL HYDROCHLORIDE 10 MILLIGRAM(S): 5 TABLET, FILM COATED ORAL at 21:45

## 2024-11-18 RX ADMIN — Medication 75 MILLILITER(S): at 14:29

## 2024-11-18 RX ADMIN — MEMANTINE HYDROCHLORIDE 10 MILLIGRAM(S): 14 CAPSULE, EXTENDED RELEASE ORAL at 18:19

## 2024-11-18 RX ADMIN — TAMSULOSIN HYDROCHLORIDE 0.4 MILLIGRAM(S): 0.4 CAPSULE ORAL at 21:45

## 2024-11-18 RX ADMIN — OXYCODONE HYDROCHLORIDE 5 MILLIGRAM(S): 30 TABLET ORAL at 23:50

## 2024-11-18 NOTE — PHYSICAL THERAPY INITIAL EVALUATION ADULT - ADDITIONAL COMMENTS
Patient is unable to provide a relevant history d/t baseline mental status.  per son, pt uses w/c mainly, lives at [facility].

## 2024-11-18 NOTE — DISCHARGE NOTE PROVIDER - NSDCFUADDAPPT_GEN_ALL_CORE_FT

## 2024-11-18 NOTE — DISCHARGE NOTE PROVIDER - HOSPITAL COURSE
84yMale with history of OA presenting for R Ant SANDOR with Dr. Fajardo on 11/18/24. Risk and benefits of surgery were explained to the patient. The patient understood and agreed to proceed with surgery. Patient underwent the procedure with no intraoperative complications. Pt was brought in stable condition to the PACU. Once stable in PACU, pt was brought to the floor. During hospital stay pt was followed by Medicine, physical therapy, Home Care during this admission. Pt is stable for discharge to 84yMale with history of OA presenting for R Ant SANDOR with Dr. Fajardo on 11/18/24. Risk and benefits of surgery were explained to the patient. The patient understood and agreed to proceed with surgery. Patient underwent the procedure with no intraoperative complications. Pt was brought in stable condition to the PACU. Once stable in PACU, pt was brought to the floor. During hospital stay pt was followed by Medicine, physical therapy, Home Care during this admission. Pt is stable for discharge to rehab

## 2024-11-18 NOTE — DISCHARGE NOTE PROVIDER - NSDCFUSCHEDAPPT_GEN_ALL_CORE_FT
[facility]  [facility] [facility]  Scheduled Appointment: 12/05/2024

## 2024-11-18 NOTE — DISCHARGE NOTE PROVIDER - CARE PROVIDER_API CALL
[facility]  Orthopaedic Surgery  [address], [address]  [place] [address]  Phone: [phone]  Fax: [phone]  Follow Up Time:

## 2024-11-18 NOTE — DISCHARGE NOTE PROVIDER - NSDCFUADDINST_GEN_ALL_CORE_FT
Elevate the leg as much as possible ("toes above the nose") to help control swelling while maintaining hip precautions. Make sure you get up and take a brief walk every two hours to help with circulation and prevent stiffness. Incentive spirometer 10X/hour. Ice to help with pain/inflammation (20 mins on, 20 mins off)    Anterior Hip Precautions: Maintain precautions recommended by physical therapy.   -DO NOT step backwards with surgical leg. No hip extension. No leaning backwards.   -DO NOT allow surgical leg to externally rotate (turn outwards).  Elevate the leg (while keeping hip precautions) as often as possible to help control swelling.     Keep Prineo Dressing Clean, Dry and Intact. May shower with Prineo Dressing. Please do not scrub, soak, peel or pick at the prineo dressing. No creams, lotions, or oils over dressing. May shower and let water run over incision, no baths. Pat dry once out of shower. Dressing to be removed in office at follow up visit in 2 weeks.     There are no staples or stitches that need to be removed.  If you notice any redness, irritation, or itching around the prineo dressing call the surgeon's office

## 2024-11-18 NOTE — CONSULT NOTE ADULT - SUBJECTIVE AND OBJECTIVE BOX
JONATAN PERDOMO JR is a 84y Male s/p ANTERIOR RIGHT HIP ARTHROPLASTY WITH IMAGE      w/ h/o Glaucoma    History of BPH    HLD (hyperlipidemia)    Depression    CAD (coronary artery disease)      denies any chest pain shortness of breath palpitation dizziness lightheadedness nausea vomiting fever or chills    Patient unable to provide medical history    Previous back surgery    History of tonsillectomy      Patient unable to provide medical history (Father, Mother)      SH: doesnot smoke or drink at this time    No Known Allergies    acetaminophen     Tablet .. 650 milliGRAM(s) Oral every 6 hours  acetaminophen   IVPB .. 1000 milliGRAM(s) IV Intermittent once  amLODIPine   Tablet 5 milliGRAM(s) Oral daily  ascorbic acid 500 milliGRAM(s) Oral two times a day  atorvastatin 10 milliGRAM(s) Oral at bedtime  ceFAZolin   IVPB 2000 milliGRAM(s) IV Intermittent every 8 hours  cetirizine 10 milliGRAM(s) Oral daily  desmopressin 0.1 milliGRAM(s) Oral at bedtime  donepezil 10 milliGRAM(s) Oral at bedtime  finasteride 5 milliGRAM(s) Oral daily  HYDROmorphone  Injectable 0.5 milliGRAM(s) IV Push every 3 hours PRN  lactated ringers. 1000 milliLiter(s) IV Continuous <Continuous>  melatonin 3 milliGRAM(s) Oral at bedtime PRN  memantine 10 milliGRAM(s) Oral two times a day  multivitamin 1 Tablet(s) Oral daily  ondansetron Injectable 4 milliGRAM(s) IV Push every 6 hours PRN  oxyCODONE    IR 5 milliGRAM(s) Oral every 4 hours PRN  oxyCODONE    IR 10 milliGRAM(s) Oral every 4 hours PRN  pantoprazole    Tablet 40 milliGRAM(s) Oral before breakfast  polyethylene glycol 3350 17 Gram(s) Oral at bedtime  senna 2 Tablet(s) Oral at bedtime  tamsulosin 0.4 milliGRAM(s) Oral at bedtime  venlafaxine XR. 75 milliGRAM(s) Oral daily    T(C): 36.4 (11-18-24 @ 19:00), Max: 36.8 (11-18-24 @ 18:00)  HR: 92 (11-18-24 @ 19:22) (66 - 92)  BP: 130/74 (11-18-24 @ 19:22) (97/62 - 132/77)  RR: 17 (11-18-24 @ 19:00) (12 - 18)  SpO2: 96% (11-18-24 @ 19:22) (95% - 98%)  HEENT unremarkable  neck no JVD or bruit  heart normal S1 S2 RRR no gallops or rubs  chest clear to auscultation  abd sof nontender non distended +bs  ext no calf tenderness    A/P   DVT PX  pain control  bowel regimen   wound care as per ortho  GI PX  antiemetics prn  incentive spirometer

## 2024-11-18 NOTE — PHYSICAL THERAPY INITIAL EVALUATION ADULT - GENERAL OBSERVATIONS, REHAB EVAL
Pt seen A&0X3 confused, [place], with R hip dressing C/D/I with cardiac monitor in place.

## 2024-11-18 NOTE — DISCHARGE NOTE PROVIDER - NSDCMRMEDTOKEN_GEN_ALL_CORE_FT
Home amLODIPine 5 mg oral tablet: 1 tab(s) orally once a day  Aricept 10 mg oral tablet: 1 tab(s) orally once a day  aspirin 81 mg oral tablet: 81 milligram(s) orally once a day  atorvastatin 10 mg oral tablet: 1 tab(s) orally once a day  clopidogrel 75 mg oral tablet: 1 tab(s) orally once a day  desmopressin 0.1 mg oral tablet: 1 tab(s) orally once a day (at bedtime)  finasteride 5 mg oral tablet: 1 tab(s) orally once a day  meloxicam 15 mg oral tablet: 1 tab(s) orally once a day as needed for  moderate pain  memantine 10 mg oral tablet: 1 tab(s) orally 2 times a day  mupirocin 2% topical ointment: Apply topically to affected area 2 times a day use on 11/13; 11/14; 11/15; 11/16; 11/17  Potassium Citrate: 15 milliequivalent(s) 2 times a day  Simbrinza 1%- 0.2% ophthalmic suspension: 1 drop(s) in each affected eye 2 times a day  tamsulosin 0.4 mg oral capsule: 1 cap(s) orally 2 times a day  Thera Tears 0.25% drops 1 drop each eye twice a day:   Tylenol 325 mg oral tablet: 2 tab(s) orally every 6 hours as needed for  pain or fever  venlafaxine 75 mg oral capsule, extended release: 1 cap(s) orally once a day  Vitamin B12 1000 mcg oral tablet: 1 tab(s) orally once a day  Vitamin D3 50 mcg (2000 intl units) oral tablet: 1 tab(s) orally once a day  ZyrTEC 10 mg oral tablet: 1 tab(s) orally once a day   acetaminophen 325 mg oral tablet: 2 tab(s) orally every 6 hours  amLODIPine 5 mg oral tablet: 1 tab(s) orally once a day  ascorbic acid 500 mg oral tablet: 1 tab(s) orally 2 times a day  aspirin 81 mg oral delayed release tablet: 1 tab(s) orally 2 times a day  atorvastatin 10 mg oral tablet: 1 tab(s) orally once a day (at bedtime)  celecoxib 200 mg oral capsule: 1 cap(s) orally every 12 hours  cetirizine 10 mg oral tablet: 1 tab(s) orally once a day  clopidogrel 75 mg oral tablet: 1 tab(s) orally once a day  desmopressin 0.1 mg oral tablet: 1 tab(s) orally once a day (at bedtime)  donepezil 10 mg oral tablet: 1 tab(s) orally once a day (at bedtime)  finasteride 5 mg oral tablet: 1 tab(s) orally once a day  memantine 10 mg oral tablet: 1 tab(s) orally 2 times a day  Multiple Vitamins oral tablet: 1 tab(s) orally once a day  nystatin 100,000 units/g topical powder: 1 Apply topically to affected area 2 times a day  pantoprazole 40 mg oral delayed release tablet: 1 tab(s) orally once a day (before a meal)  polyethylene glycol 3350 oral powder for reconstitution: 17 gram(s) orally once a day (at bedtime)  Potassium Citrate: 15 milliequivalent(s) 2 times a day  senna leaf extract oral tablet: 2 tab(s) orally once a day (at bedtime)  tamsulosin 0.4 mg oral capsule: 1 cap(s) orally once a day (at bedtime)  traMADol 50 mg oral tablet: 0.5 tab(s) orally every 4 hours As needed Moderate Pain (4 - 6)  traMADol 50 mg oral tablet: 1 tab(s) orally every 4 hours As needed Severe Pain (7 - 10)  venlafaxine 75 mg oral capsule, extended release: 1 cap(s) orally once a day

## 2024-11-18 NOTE — DISCHARGE NOTE NURSING/CASE MANAGEMENT/SOCIAL WORK - PATIENT PORTAL LINK FT
You can access the FollowMyHealth Patient Portal offered by [facility] by registering at the following website: [email or web address]. By joining [facility]’s FollowMyHealth portal, you will also be able to view your health information using other applications (apps) compatible with our system.

## 2024-11-18 NOTE — DISCHARGE NOTE NURSING/CASE MANAGEMENT/SOCIAL WORK - FINANCIAL ASSISTANCE
[facility] provides services at a reduced cost to those who are determined to be eligible through [facility]’s financial assistance program. Information regarding [facility]’s financial assistance program can be found by going to [email or web address] or by calling [phone].

## 2024-11-18 NOTE — PATIENT PROFILE ADULT - FALL HARM RISK - HARM RISK INTERVENTIONS

## 2024-11-18 NOTE — PATIENT PROFILE ADULT - ABILITY TO HEAR (WITH HEARING AID OR HEARING APPLIANCE IF NORMALLY USED):
oral Mildly to Moderately Impaired: difficulty hearing in some environments or speaker may need to increase volume or speak distinctly

## 2024-11-19 LAB
ANION GAP SERPL CALC-SCNC: 6 MMOL/L — SIGNIFICANT CHANGE UP (ref 5–17)
BUN SERPL-MCNC: 20 MG/DL — SIGNIFICANT CHANGE UP (ref 7–23)
CALCIUM SERPL-MCNC: 8.5 MG/DL — SIGNIFICANT CHANGE UP (ref 8.5–10.1)
CHLORIDE SERPL-SCNC: 103 MMOL/L — SIGNIFICANT CHANGE UP (ref 96–108)
CO2 SERPL-SCNC: 25 MMOL/L — SIGNIFICANT CHANGE UP (ref 22–31)
CREAT SERPL-MCNC: 1.1 MG/DL — SIGNIFICANT CHANGE UP (ref 0.5–1.3)
EGFR: 66 ML/MIN/1.73M2 — SIGNIFICANT CHANGE UP
GLUCOSE SERPL-MCNC: 141 MG/DL — HIGH (ref 70–99)
HCT VFR BLD CALC: 32.5 % — LOW (ref 39–50)
HGB BLD-MCNC: 11.1 G/DL — LOW (ref 13–17)
MCHC RBC-ENTMCNC: 28.8 PG — SIGNIFICANT CHANGE UP (ref 27–34)
MCHC RBC-ENTMCNC: 34.2 G/DL — SIGNIFICANT CHANGE UP (ref 32–36)
MCV RBC AUTO: 84.2 FL — SIGNIFICANT CHANGE UP (ref 80–100)
NRBC # BLD: 0 /100 WBCS — SIGNIFICANT CHANGE UP (ref 0–0)
PLATELET # BLD AUTO: 195 K/UL — SIGNIFICANT CHANGE UP (ref 150–400)
POTASSIUM SERPL-MCNC: 4.1 MMOL/L — SIGNIFICANT CHANGE UP (ref 3.5–5.3)
POTASSIUM SERPL-SCNC: 4.1 MMOL/L — SIGNIFICANT CHANGE UP (ref 3.5–5.3)
RBC # BLD: 3.86 M/UL — LOW (ref 4.2–5.8)
RBC # FLD: 13.5 % — SIGNIFICANT CHANGE UP (ref 10.3–14.5)
SODIUM SERPL-SCNC: 134 MMOL/L — LOW (ref 135–145)
WBC # BLD: 10.03 K/UL — SIGNIFICANT CHANGE UP (ref 3.8–10.5)
WBC # FLD AUTO: 10.03 K/UL — SIGNIFICANT CHANGE UP (ref 3.8–10.5)

## 2024-11-19 RX ORDER — TRAMADOL HYDROCHLORIDE 300 MG/1
25 CAPSULE ORAL EVERY 4 HOURS
Refills: 0 | Status: DISCONTINUED | OUTPATIENT
Start: 2024-11-19 | End: 2024-11-21

## 2024-11-19 RX ORDER — SODIUM CHLORIDE 9 MG/ML
500 INJECTION, SOLUTION INTRAMUSCULAR; INTRAVENOUS; SUBCUTANEOUS ONCE
Refills: 0 | Status: COMPLETED | OUTPATIENT
Start: 2024-11-19 | End: 2024-11-19

## 2024-11-19 RX ORDER — TRAMADOL HYDROCHLORIDE 300 MG/1
50 CAPSULE ORAL EVERY 4 HOURS
Refills: 0 | Status: DISCONTINUED | OUTPATIENT
Start: 2024-11-19 | End: 2024-11-21

## 2024-11-19 RX ADMIN — CLOPIDOGREL 75 MILLIGRAM(S): 75 TABLET, FILM COATED ORAL at 13:26

## 2024-11-19 RX ADMIN — PANTOPRAZOLE SODIUM 40 MILLIGRAM(S): 40 TABLET, DELAYED RELEASE ORAL at 06:42

## 2024-11-19 RX ADMIN — MEMANTINE HYDROCHLORIDE 10 MILLIGRAM(S): 14 CAPSULE, EXTENDED RELEASE ORAL at 18:43

## 2024-11-19 RX ADMIN — Medication 100 MILLIGRAM(S): at 04:00

## 2024-11-19 RX ADMIN — ACETAMINOPHEN 500MG 650 MILLIGRAM(S): 500 TABLET, COATED ORAL at 07:42

## 2024-11-19 RX ADMIN — POLYETHYLENE GLYCOL 3350 17 GRAM(S): 17 POWDER, FOR SOLUTION ORAL at 22:46

## 2024-11-19 RX ADMIN — ACETAMINOPHEN 500MG 650 MILLIGRAM(S): 500 TABLET, COATED ORAL at 17:24

## 2024-11-19 RX ADMIN — ACETAMINOPHEN 500MG 650 MILLIGRAM(S): 500 TABLET, COATED ORAL at 18:24

## 2024-11-19 RX ADMIN — AMLODIPINE BESYLATE 5 MILLIGRAM(S): 10 TABLET ORAL at 06:43

## 2024-11-19 RX ADMIN — TRAMADOL HYDROCHLORIDE 50 MILLIGRAM(S): 300 CAPSULE ORAL at 09:33

## 2024-11-19 RX ADMIN — Medication 81 MILLIGRAM(S): at 17:25

## 2024-11-19 RX ADMIN — Medication 81 MILLIGRAM(S): at 06:43

## 2024-11-19 RX ADMIN — OXYCODONE HYDROCHLORIDE 5 MILLIGRAM(S): 30 TABLET ORAL at 06:45

## 2024-11-19 RX ADMIN — TRAMADOL HYDROCHLORIDE 50 MILLIGRAM(S): 300 CAPSULE ORAL at 10:33

## 2024-11-19 RX ADMIN — DONEPEZIL HYDROCHLORIDE 10 MILLIGRAM(S): 5 TABLET, FILM COATED ORAL at 22:46

## 2024-11-19 RX ADMIN — CELECOXIB 200 MILLIGRAM(S): 200 CAPSULE ORAL at 17:25

## 2024-11-19 RX ADMIN — TAMSULOSIN HYDROCHLORIDE 0.4 MILLIGRAM(S): 0.4 CAPSULE ORAL at 22:46

## 2024-11-19 RX ADMIN — Medication 1 TABLET(S): at 13:25

## 2024-11-19 RX ADMIN — Medication 500 MILLIGRAM(S): at 06:43

## 2024-11-19 RX ADMIN — VENLAFAXINE HYDROCHLORIDE 75 MILLIGRAM(S): 75 CAPSULE, EXTENDED RELEASE ORAL at 13:25

## 2024-11-19 RX ADMIN — CELECOXIB 200 MILLIGRAM(S): 200 CAPSULE ORAL at 06:43

## 2024-11-19 RX ADMIN — DESMOPRESSIN ACETATE 0.1 MILLIGRAM(S): 4 SOLUTION INTRAVENOUS at 22:46

## 2024-11-19 RX ADMIN — ACETAMINOPHEN 500MG 650 MILLIGRAM(S): 500 TABLET, COATED ORAL at 14:26

## 2024-11-19 RX ADMIN — ACETAMINOPHEN 500MG 400 MILLIGRAM(S): 500 TABLET, COATED ORAL at 00:36

## 2024-11-19 RX ADMIN — ACETAMINOPHEN 500MG 650 MILLIGRAM(S): 500 TABLET, COATED ORAL at 06:42

## 2024-11-19 RX ADMIN — Medication 115 MILLILITER(S): at 06:42

## 2024-11-19 RX ADMIN — Medication 5 MILLIGRAM(S): at 13:25

## 2024-11-19 RX ADMIN — ACETAMINOPHEN 500MG 650 MILLIGRAM(S): 500 TABLET, COATED ORAL at 13:26

## 2024-11-19 RX ADMIN — OXYCODONE HYDROCHLORIDE 5 MILLIGRAM(S): 30 TABLET ORAL at 07:42

## 2024-11-19 RX ADMIN — Medication 2 TABLET(S): at 22:46

## 2024-11-19 RX ADMIN — ACETAMINOPHEN 500MG 1000 MILLIGRAM(S): 500 TABLET, COATED ORAL at 01:35

## 2024-11-19 RX ADMIN — CELECOXIB 200 MILLIGRAM(S): 200 CAPSULE ORAL at 07:42

## 2024-11-19 RX ADMIN — MEMANTINE HYDROCHLORIDE 10 MILLIGRAM(S): 14 CAPSULE, EXTENDED RELEASE ORAL at 07:01

## 2024-11-19 RX ADMIN — Medication 10 MILLIGRAM(S): at 22:46

## 2024-11-19 RX ADMIN — DEXAMETHASONE 102 MILLIGRAM(S): 1.5 TABLET ORAL at 06:42

## 2024-11-19 RX ADMIN — TRAMADOL HYDROCHLORIDE 50 MILLIGRAM(S): 300 CAPSULE ORAL at 18:24

## 2024-11-19 RX ADMIN — TRAMADOL HYDROCHLORIDE 50 MILLIGRAM(S): 300 CAPSULE ORAL at 17:25

## 2024-11-19 RX ADMIN — SODIUM CHLORIDE 500 MILLILITER(S): 9 INJECTION, SOLUTION INTRAMUSCULAR; INTRAVENOUS; SUBCUTANEOUS at 10:15

## 2024-11-19 RX ADMIN — CELECOXIB 200 MILLIGRAM(S): 200 CAPSULE ORAL at 18:24

## 2024-11-19 RX ADMIN — Medication 500 MILLIGRAM(S): at 17:24

## 2024-11-19 RX ADMIN — CETIRIZINE HYDROCHLORIDE 10 MILLIGRAM(S): 10 TABLET ORAL at 13:25

## 2024-11-19 NOTE — OCCUPATIONAL THERAPY INITIAL EVALUATION ADULT - STRENGTHENING, PT EVAL
Pt will increased BUE/LE muscle strength by 1 full grade to maximize independence with functional mobility and self care tasks within 6 weeks

## 2024-11-19 NOTE — OCCUPATIONAL THERAPY INITIAL EVALUATION ADULT - GENERAL OBSERVATIONS, REHAB EVAL
Pt was seen for initial OT consult, encountered in bed  on cardiac monitoring in NAD. V in LUE is infusing. Pneumatic teds donned. Right hip dermal dressing is intact. Grade 1+ edema with stiffness and decreased ROM noted in RUE. Increased stiff noed in trunk; pt was AA&O to self confused at times, but cooperative & followed commands with verbal cues for sequencing. No excessive abduction, extension and external rotation in left hip . Pt c/o  right hip pain 4/10 due to s/p anterior THR; this limits pt's activity tolerance ,balance, ADL management and functional mobility. Pt was seen for initial OT consult, encountered in bed  on cardiac monitoring in NAD. V in LUE is infusing. Pneumatic teds donned. Right hip dermal dressing is intact. Grade 1+ edema with stiffness and decreased ROM noted in RUE. Increased stiffness noted in trunk; pt was AA&O to self confused at times, but cooperative & followed commands with verbal cues for sequencing. No excessive abduction, extension and external rotation in right hip . Pt c/o  right hip pain 4/10 due to s/p anterior THR; this limits pt's activity tolerance ,balance, ADL management and functional mobility.

## 2024-11-19 NOTE — OCCUPATIONAL THERAPY INITIAL EVALUATION ADULT - PERTINENT HX OF CURRENT PROBLEM, REHAB EVAL
Pt is an 85 y/o admitted for elective surgery for  right anterior with MD Fajardo on 11/18/24, THR due to OA, chronic pain and DJD. Pt is an 83 y/o admitted for elective surgery for  right anterior THR with MD Fajardo on 11/18/24, due to OA, chronic pain and DJD.

## 2024-11-19 NOTE — OCCUPATIONAL THERAPY INITIAL EVALUATION ADULT - TRANSFER SAFETY CONCERNS NOTED: BED/CHAIR, REHAB EVAL
decreased balance during turns/decreased safety awareness/losing balance/decreased proprioception/decreased sequencing ability/squat pivot/stand pivot/decreased step length/inability to maintain weight-bearing restrictions w/o assist/decreased weight-shifting ability

## 2024-11-19 NOTE — OCCUPATIONAL THERAPY INITIAL EVALUATION ADULT - PERSONAL SAFETY AND JUDGMENT, REHAB EVAL
Pt needs verbal cues for proper hand placement and to safely maneuver rolling walker./impaired/at risk behaviors demonstrated

## 2024-11-19 NOTE — OCCUPATIONAL THERAPY INITIAL EVALUATION ADULT - ADDITIONAL COMMENTS
Prior to admission, pt ambulated with an antalgic gait for very short distances (5ft) with a rolling walker and assistance from staff . Pt owns a straight cane and a wheelchair.  Presently pt needs assistance with lower body self care tasks due to pain, weakness, stiffness and decreased ROM from right anterior THR. Pt is [place] , right hand dominant and wears glasses for reading. Dexterity and fine motor skills are diminished in both hands. Upon evaluation, Pt performed bed mobility with mod assist and use of bed rails. Pt sat and dangled at the edge of bed with good balance. After height of bed was raised , pt transferred sit to stand with mod assistx2, rolling walker / WBAT. Pt took 6 small steps to get to to bedside recliner. Gait was unsteady and balance was poor+.

## 2024-11-19 NOTE — OCCUPATIONAL THERAPY INITIAL EVALUATION ADULT - PRECAUTIONS/LIMITATIONS, REHAB EVAL
Pt's mobility and ADL management is limited due to pain in right hip ./fall precautions/right hip precautions

## 2024-11-19 NOTE — OCCUPATIONAL THERAPY INITIAL EVALUATION ADULT - IMPAIRMENTS CONTRIBUTING IMPAIRED BED MOBILITY, REHAB EVAL
impaired balance/cognition/impaired coordination/decreased flexibility/pain/impaired postural control/decreased strength

## 2024-11-19 NOTE — OCCUPATIONAL THERAPY INITIAL EVALUATION ADULT - BALANCE TRAINING, PT EVAL
Pt will increased standing balance from poor+ to fair+ in 6 weeks to prevent falls, optimize pt's ability for ADL management & safely navigate in all terrains

## 2024-11-19 NOTE — OCCUPATIONAL THERAPY INITIAL EVALUATION ADULT - LIVES WITH, PROFILE
As per preop assessment confirmation "Pt lives at [facility] assisted living [facility]  with a ramp entrance. Once inside, the pt has an elevator that takes the pt to the main floor where the pts room is. The bathroom has a walk in shower stall, fixed/retractable shower head, comfort height toilet seat and grab bars. As per preop assessment confirmation "pt lives at [facility] assisted living [facility]  with a ramp entrance. Once inside, the pt has an elevator that takes the pt to the main floor where the pts room is. The bathroom has a walk in shower stall, fixed/retractable shower head, comfort height toilet seat and grab bars.

## 2024-11-20 PROCEDURE — 71045 X-RAY EXAM CHEST 1 VIEW: CPT | Mod: 26

## 2024-11-20 RX ORDER — BENZOCAINE, MENTHOL 15; 3.6 MG/1; MG/1
1 LOZENGE ORAL EVERY 4 HOURS
Refills: 0 | Status: DISCONTINUED | OUTPATIENT
Start: 2024-11-20 | End: 2024-11-21

## 2024-11-20 RX ORDER — MAGNESIUM, ALUMINUM HYDROXIDE 200-225/5
30 SUSPENSION, ORAL (FINAL DOSE FORM) ORAL EVERY 4 HOURS
Refills: 0 | Status: DISCONTINUED | OUTPATIENT
Start: 2024-11-20 | End: 2024-11-21

## 2024-11-20 RX ORDER — ALBUTEROL 90 MCG
2.5 AEROSOL (GRAM) INHALATION ONCE
Refills: 0 | Status: COMPLETED | OUTPATIENT
Start: 2024-11-20 | End: 2024-11-20

## 2024-11-20 RX ORDER — NYSTATIN 100000 [USP'U]/G
1 POWDER TOPICAL
Refills: 0 | Status: DISCONTINUED | OUTPATIENT
Start: 2024-11-20 | End: 2024-11-21

## 2024-11-20 RX ADMIN — ACETAMINOPHEN 500MG 650 MILLIGRAM(S): 500 TABLET, COATED ORAL at 18:45

## 2024-11-20 RX ADMIN — PANTOPRAZOLE SODIUM 40 MILLIGRAM(S): 40 TABLET, DELAYED RELEASE ORAL at 06:25

## 2024-11-20 RX ADMIN — CETIRIZINE HYDROCHLORIDE 10 MILLIGRAM(S): 10 TABLET ORAL at 13:27

## 2024-11-20 RX ADMIN — ACETAMINOPHEN 500MG 650 MILLIGRAM(S): 500 TABLET, COATED ORAL at 14:30

## 2024-11-20 RX ADMIN — POLYETHYLENE GLYCOL 3350 17 GRAM(S): 17 POWDER, FOR SOLUTION ORAL at 21:15

## 2024-11-20 RX ADMIN — CELECOXIB 200 MILLIGRAM(S): 200 CAPSULE ORAL at 06:26

## 2024-11-20 RX ADMIN — CELECOXIB 200 MILLIGRAM(S): 200 CAPSULE ORAL at 18:45

## 2024-11-20 RX ADMIN — ACETAMINOPHEN 500MG 650 MILLIGRAM(S): 500 TABLET, COATED ORAL at 06:25

## 2024-11-20 RX ADMIN — ACETAMINOPHEN 500MG 650 MILLIGRAM(S): 500 TABLET, COATED ORAL at 17:45

## 2024-11-20 RX ADMIN — Medication 81 MILLIGRAM(S): at 17:45

## 2024-11-20 RX ADMIN — CLOPIDOGREL 75 MILLIGRAM(S): 75 TABLET, FILM COATED ORAL at 13:27

## 2024-11-20 RX ADMIN — ACETAMINOPHEN 500MG 650 MILLIGRAM(S): 500 TABLET, COATED ORAL at 13:27

## 2024-11-20 RX ADMIN — CELECOXIB 200 MILLIGRAM(S): 200 CAPSULE ORAL at 07:29

## 2024-11-20 RX ADMIN — ACETAMINOPHEN 500MG 650 MILLIGRAM(S): 500 TABLET, COATED ORAL at 00:11

## 2024-11-20 RX ADMIN — CELECOXIB 200 MILLIGRAM(S): 200 CAPSULE ORAL at 17:46

## 2024-11-20 RX ADMIN — Medication 81 MILLIGRAM(S): at 06:25

## 2024-11-20 RX ADMIN — TRAMADOL HYDROCHLORIDE 50 MILLIGRAM(S): 300 CAPSULE ORAL at 08:28

## 2024-11-20 RX ADMIN — DONEPEZIL HYDROCHLORIDE 10 MILLIGRAM(S): 5 TABLET, FILM COATED ORAL at 21:21

## 2024-11-20 RX ADMIN — MEMANTINE HYDROCHLORIDE 10 MILLIGRAM(S): 14 CAPSULE, EXTENDED RELEASE ORAL at 17:47

## 2024-11-20 RX ADMIN — Medication 2.5 MILLIGRAM(S): at 13:31

## 2024-11-20 RX ADMIN — Medication 2 TABLET(S): at 21:15

## 2024-11-20 RX ADMIN — NYSTATIN 1 APPLICATION(S): 100000 POWDER TOPICAL at 17:46

## 2024-11-20 RX ADMIN — Medication 10 MILLIGRAM(S): at 21:21

## 2024-11-20 RX ADMIN — VENLAFAXINE HYDROCHLORIDE 75 MILLIGRAM(S): 75 CAPSULE, EXTENDED RELEASE ORAL at 13:26

## 2024-11-20 RX ADMIN — Medication 500 MILLIGRAM(S): at 06:25

## 2024-11-20 RX ADMIN — ACETAMINOPHEN 500MG 650 MILLIGRAM(S): 500 TABLET, COATED ORAL at 01:09

## 2024-11-20 RX ADMIN — DESMOPRESSIN ACETATE 0.1 MILLIGRAM(S): 4 SOLUTION INTRAVENOUS at 21:24

## 2024-11-20 RX ADMIN — MEMANTINE HYDROCHLORIDE 10 MILLIGRAM(S): 14 CAPSULE, EXTENDED RELEASE ORAL at 06:26

## 2024-11-20 RX ADMIN — TAMSULOSIN HYDROCHLORIDE 0.4 MILLIGRAM(S): 0.4 CAPSULE ORAL at 21:15

## 2024-11-20 RX ADMIN — Medication 500 MILLIGRAM(S): at 17:46

## 2024-11-20 RX ADMIN — TRAMADOL HYDROCHLORIDE 50 MILLIGRAM(S): 300 CAPSULE ORAL at 09:30

## 2024-11-20 RX ADMIN — ACETAMINOPHEN 500MG 650 MILLIGRAM(S): 500 TABLET, COATED ORAL at 23:47

## 2024-11-20 RX ADMIN — Medication 1 TABLET(S): at 13:28

## 2024-11-20 RX ADMIN — AMLODIPINE BESYLATE 5 MILLIGRAM(S): 10 TABLET ORAL at 06:25

## 2024-11-20 RX ADMIN — Medication 5 MILLIGRAM(S): at 13:29

## 2024-11-20 RX ADMIN — ACETAMINOPHEN 500MG 650 MILLIGRAM(S): 500 TABLET, COATED ORAL at 07:29

## 2024-11-21 ENCOUNTER — APPOINTMENT (OUTPATIENT)
Dept: GASTROENTEROLOGY | Facility: CLINIC | Age: 84
End: 2024-11-21

## 2024-11-21 VITALS
HEART RATE: 80 BPM | OXYGEN SATURATION: 94 % | DIASTOLIC BLOOD PRESSURE: 62 MMHG | TEMPERATURE: 98 F | SYSTOLIC BLOOD PRESSURE: 110 MMHG | RESPIRATION RATE: 18 BRPM

## 2024-11-21 RX ORDER — TRAMADOL HYDROCHLORIDE 300 MG/1
0.5 CAPSULE ORAL
Qty: 0 | Refills: 0 | DISCHARGE
Start: 2024-11-21

## 2024-11-21 RX ORDER — CYANOCOBALAMIN/FOLIC AC/VIT B6 1-2.2-25MG
1 TABLET ORAL
Qty: 0 | Refills: 0 | DISCHARGE
Start: 2024-11-21

## 2024-11-21 RX ORDER — ACETAMINOPHEN 500MG 500 MG/1
2 TABLET, COATED ORAL
Qty: 0 | Refills: 0 | DISCHARGE
Start: 2024-11-21

## 2024-11-21 RX ORDER — CETIRIZINE HYDROCHLORIDE 10 MG/1
1 TABLET ORAL
Qty: 0 | Refills: 0 | DISCHARGE
Start: 2024-11-21

## 2024-11-21 RX ORDER — MEMANTINE HYDROCHLORIDE 14 MG/1
1 CAPSULE, EXTENDED RELEASE ORAL
Qty: 0 | Refills: 0 | DISCHARGE
Start: 2024-11-21

## 2024-11-21 RX ORDER — SENNOSIDES 8.6 MG
2 TABLET ORAL
Qty: 0 | Refills: 0 | DISCHARGE
Start: 2024-11-21

## 2024-11-21 RX ORDER — CELECOXIB 200 MG/1
1 CAPSULE ORAL
Qty: 0 | Refills: 0 | DISCHARGE
Start: 2024-11-21

## 2024-11-21 RX ORDER — MULTIVIT WITH MINERALS/LUTEIN
1 TABLET ORAL
Qty: 0 | Refills: 0 | DISCHARGE
Start: 2024-11-21

## 2024-11-21 RX ORDER — MELOXICAM 7.5 MG
1 TABLET ORAL
Refills: 0 | DISCHARGE

## 2024-11-21 RX ORDER — NYSTATIN 100000 [USP'U]/G
1 POWDER TOPICAL
Qty: 0 | Refills: 0 | DISCHARGE
Start: 2024-11-21

## 2024-11-21 RX ORDER — PANTOPRAZOLE SODIUM 40 MG/1
1 TABLET, DELAYED RELEASE ORAL
Qty: 0 | Refills: 0 | DISCHARGE
Start: 2024-11-21

## 2024-11-21 RX ORDER — CETIRIZINE HCL 10 MG/1
1 TABLET ORAL
Refills: 0 | DISCHARGE

## 2024-11-21 RX ORDER — POLYETHYLENE GLYCOL 3350 17 G/17G
17 POWDER, FOR SOLUTION ORAL
Qty: 0 | Refills: 0 | DISCHARGE
Start: 2024-11-21

## 2024-11-21 RX ORDER — DONEPEZIL HYDROCHLORIDE 5 MG/1
1 TABLET, FILM COATED ORAL
Qty: 0 | Refills: 0 | DISCHARGE
Start: 2024-11-21

## 2024-11-21 RX ORDER — DESMOPRESSIN ACETATE 4 UG/ML
1 SOLUTION INTRAVENOUS
Qty: 0 | Refills: 0 | DISCHARGE
Start: 2024-11-21

## 2024-11-21 RX ORDER — CLOPIDOGREL 75 MG/1
1 TABLET, FILM COATED ORAL
Qty: 0 | Refills: 0 | DISCHARGE
Start: 2024-11-21

## 2024-11-21 RX ORDER — AMLODIPINE BESYLATE 10 MG/1
1 TABLET ORAL
Qty: 0 | Refills: 0 | DISCHARGE
Start: 2024-11-21

## 2024-11-21 RX ORDER — TRAMADOL HYDROCHLORIDE 300 MG/1
1 CAPSULE ORAL
Qty: 0 | Refills: 0 | DISCHARGE
Start: 2024-11-21

## 2024-11-21 RX ORDER — TAMSULOSIN HYDROCHLORIDE 0.4 MG/1
1 CAPSULE ORAL
Qty: 0 | Refills: 0 | DISCHARGE
Start: 2024-11-21

## 2024-11-21 RX ORDER — VENLAFAXINE HYDROCHLORIDE 75 MG/1
1 CAPSULE, EXTENDED RELEASE ORAL
Qty: 0 | Refills: 0 | DISCHARGE
Start: 2024-11-21

## 2024-11-21 RX ADMIN — PANTOPRAZOLE SODIUM 40 MILLIGRAM(S): 40 TABLET, DELAYED RELEASE ORAL at 06:05

## 2024-11-21 RX ADMIN — NYSTATIN 1 APPLICATION(S): 100000 POWDER TOPICAL at 06:23

## 2024-11-21 RX ADMIN — ACETAMINOPHEN 500MG 650 MILLIGRAM(S): 500 TABLET, COATED ORAL at 06:05

## 2024-11-21 RX ADMIN — CETIRIZINE HYDROCHLORIDE 10 MILLIGRAM(S): 10 TABLET ORAL at 12:00

## 2024-11-21 RX ADMIN — CELECOXIB 200 MILLIGRAM(S): 200 CAPSULE ORAL at 06:05

## 2024-11-21 RX ADMIN — CLOPIDOGREL 75 MILLIGRAM(S): 75 TABLET, FILM COATED ORAL at 12:00

## 2024-11-21 RX ADMIN — ACETAMINOPHEN 500MG 650 MILLIGRAM(S): 500 TABLET, COATED ORAL at 12:00

## 2024-11-21 RX ADMIN — Medication 1 TABLET(S): at 12:00

## 2024-11-21 RX ADMIN — VENLAFAXINE HYDROCHLORIDE 75 MILLIGRAM(S): 75 CAPSULE, EXTENDED RELEASE ORAL at 12:00

## 2024-11-21 RX ADMIN — CELECOXIB 200 MILLIGRAM(S): 200 CAPSULE ORAL at 07:05

## 2024-11-21 RX ADMIN — Medication 5 MILLIGRAM(S): at 12:00

## 2024-11-21 RX ADMIN — ACETAMINOPHEN 500MG 650 MILLIGRAM(S): 500 TABLET, COATED ORAL at 12:43

## 2024-11-21 RX ADMIN — MEMANTINE HYDROCHLORIDE 10 MILLIGRAM(S): 14 CAPSULE, EXTENDED RELEASE ORAL at 06:23

## 2024-11-21 RX ADMIN — ACETAMINOPHEN 500MG 650 MILLIGRAM(S): 500 TABLET, COATED ORAL at 07:05

## 2024-11-21 RX ADMIN — ACETAMINOPHEN 500MG 650 MILLIGRAM(S): 500 TABLET, COATED ORAL at 00:47

## 2024-11-21 RX ADMIN — AMLODIPINE BESYLATE 5 MILLIGRAM(S): 10 TABLET ORAL at 06:05

## 2024-11-21 RX ADMIN — Medication 81 MILLIGRAM(S): at 06:05

## 2024-11-21 RX ADMIN — Medication 500 MILLIGRAM(S): at 06:05

## 2024-11-21 NOTE — PROGRESS NOTE ADULT - PROVIDER SPECIALTY LIST ADULT
Internal Medicine
Internal Medicine
Orthopedics
Orthopedics
Internal Medicine
Orthopedics
Orthopedics

## 2024-11-21 NOTE — PROGRESS NOTE ADULT - SUBJECTIVE AND OBJECTIVE BOX
JONATAN PERDOMO JR is a 84y Male s/p ANTERIOR RIGHT HIP ARTHROPLASTY WITH IMAGE        denies any chest pain shortness of breath palpitation dizziness lightheadedness nausea vomiting fever or chills    T(C): 37.1 (11-19-24 @ 09:47), Max: 37.1 (11-19-24 @ 09:47)  HR: 80 (11-19-24 @ 09:47) (66 - 92)  BP: 120/66 (11-19-24 @ 09:47) (97/62 - 153/72)  RR: 18 (11-19-24 @ 09:47) (12 - 18)  SpO2: 97% (11-19-24 @ 09:47) (94% - 98%)  no jvd/bruit  s1 s2 rrr  cta  s/nt/nd  no calf tend                        11.1   10.03 )-----------( 195      ( 19 Nov 2024 06:30 )             32.5   11-19    134[L]  |  103  |  20  ----------------------------<  141[H]  4.1   |  25  |  1.10    Ca    8.5      19 Nov 2024 06:30        cont dvt px  pain control  bowel regimen  antiemetics  incentive spirometer
Patient is s/p R Anterior SANDOR POD#0  Pt tolerated procedure well without any intra-op complications.   Pt doing well at this time. Pain is controlled.   Denies CP/SOB/Dizziness/N/V/D/HA.     Vital Signs Last 24 Hrs  T(C): 36.8 (18 Nov 2024 18:00), Max: 36.8 (18 Nov 2024 18:00)  T(F): 98.2 (18 Nov 2024 18:00), Max: 98.2 (18 Nov 2024 18:00)  HR: 80 (18 Nov 2024 18:00) (66 - 80)  BP: 109/63 (18 Nov 2024 18:00) (97/62 - 132/77)  BP(mean): --  RR: 17 (18 Nov 2024 18:00) (12 - 18)  SpO2: 98% (18 Nov 2024 18:00) (95% - 98%)    Parameters below as of 18 Nov 2024 17:00  Patient On (Oxygen Delivery Method): nasal cannula        PE:  General: A&Ox3 NAD  RLE: Dressing C/D/I. Motor intact + EHL/FHL/TA/GS. Sensation is grossly intact. Extremity warm. Compartments soft, compressible, no calf tenderness. DP 2+   LLE: Motor intact + EHL/FHL/TA/GS. Sensation is grossly intact. Extremity warm. Compartments soft, compressible, no calf tenderness. DP 2+     Labs:                          12.6   9.62  )-----------( 195      ( 18 Nov 2024 14:21 )             37.7       11-18    136  |  105  |  12  ----------------------------<  154[H]  3.9   |  26  |  0.94    Ca    8.5      18 Nov 2024 14:21        A/P: Patient is a 84y y/o Male s/p R Anterior SANDOR, POD #0  -wound care, isometric exercises, GI motility, new medications, hospital course reviewed with pt  -Pain control/analgesia  -Inc spirometry reviewed and counseled  -DVT ppx with venodynes and ASA 81 BID/Plavix  -F/U AM Labs  -PT/OT/WBAT, Anterior hip precautions,   -Antibiotic post op  -Medical consult  -Discharge planning    
Patient is seen and examined at bedside. Had pain with positional changes.     Vital Signs Last 24 Hrs  T(C): 36.7 (20 Nov 2024 06:10), Max: 37 (19 Nov 2024 18:10)  T(F): 98 (20 Nov 2024 06:10), Max: 98.6 (19 Nov 2024 18:10)  HR: 78 (20 Nov 2024 06:10) (58 - 83)  BP: 168/84 (20 Nov 2024 06:10) (113/67 - 168/84)  BP(mean): --  RR: 18 (20 Nov 2024 06:10) (17 - 18)  SpO2: 93% (20 Nov 2024 06:10) (93% - 98%)    Parameters below as of 20 Nov 2024 06:10  Patient On (Oxygen Delivery Method): room air        GEN: NAD  ABD: soft, NT/ND; no rebound or guarding;  Neurologic: AAOx2. Pleasantly confused.   RLE: Prineo dressing C/D/I. Motor intact + EHL/FHL/TA/GS. Sensation is grossly intact.  Extremities warm. . Compartments soft, compressible. No calf tenderness. DP 2+.  LLE:  Motor intact + EHL/FHL/TA/GS. Sensation is grossly intact. Extremities warm. Compartments soft, compressible. No calf tenderness.. DP 2+.    Labs:                          11.1   10.03 )-----------( 195      ( 19 Nov 2024 06:30 )             32.5       11-19    134[L]  |  103  |  20  ----------------------------<  141[H]  4.1   |  25  |  1.10    Ca    8.5      19 Nov 2024 06:30        A/P: Patient is a 84y y/o Male s/p right anterior SANDOR, POD #2  -coughing after breakfast: dysphagia screening done by RN, pt passed. Neb tx X 1, F/U CXR. Per pt son, pt frequently coughs while eating.   -Pain control/analgesia PRN  -Inc spirometry reviewed and counseled, frequent reminding from staff in place  -Venodynes/foot pumps  -PT/OT/WBAT  -Add nystatin powder to skin fold right groin  -Anticoagulation: asa 81mg BID  -DC planning for rehab   -D/W DR Fajardo  
JONATAN PERDOMO JR is a 84y Male s/p ANTERIOR RIGHT HIP ARTHROPLASTY WITH IMAGE        denies any chest pain shortness of breath palpitation dizziness lightheadedness nausea vomiting fever or chills    T(C): 36.9 (11-21-24 @ 10:57), Max: 36.9 (11-21-24 @ 10:57)  HR: 78 (11-21-24 @ 10:57) (77 - 81)  BP: 130/68 (11-21-24 @ 10:57) (103/64 - 150/76)  RR: 19 (11-21-24 @ 10:57) (18 - 19)  SpO2: 97% (11-21-24 @ 10:57) (93% - 97%)  no jvd/bruit  s1 s2 rrr  cta  s/nt/nd  no calf tend      cont dvt px  pain control  bowel regimen  antiemetics  incentive spirometer
JONATAN PERDOMO JR is a 84y Male s/p ANTERIOR RIGHT HIP ARTHROPLASTY WITH IMAGE        denies any chest pain shortness of breath palpitation dizziness lightheadedness nausea vomiting fever or chills    T(C): 37.6 (11-20-24 @ 09:00), Max: 37.6 (11-20-24 @ 09:00)  HR: 76 (11-20-24 @ 09:15) (70 - 78)  BP: 119/67 (11-20-24 @ 09:15) (113/67 - 168/84)  RR: 18 (11-20-24 @ 09:00) (17 - 18)  SpO2: 96% (11-20-24 @ 09:15) (93% - 96%)  no jvd/bruit  s1 s2 rrr  cta  s/nt/nd  no calf tend                          11.1   10.03 )-----------( 195      ( 19 Nov 2024 06:30 )             32.5   11-19    134[L]  |  103  |  20  ----------------------------<  141[H]  4.1   |  25  |  1.10    Ca    8.5      19 Nov 2024 06:30      cont dvt px  pain control  bowel regimen  antiemetics  incentive spirometer
Patient is seen and examined at bedside with pt son at bedside.     Vital Signs Last 24 Hrs  T(C): 36.5 (19 Nov 2024 11:56), Max: 37.1 (19 Nov 2024 09:47)  T(F): 97.7 (19 Nov 2024 11:56), Max: 98.8 (19 Nov 2024 09:47)  HR: 58 (19 Nov 2024 11:56) (58 - 92)  BP: 126/73 (19 Nov 2024 11:56) (97/62 - 153/72)  BP(mean): --  RR: 18 (19 Nov 2024 11:56) (12 - 18)  SpO2: 98% (19 Nov 2024 11:56) (94% - 98%)    Parameters below as of 19 Nov 2024 11:56  Patient On (Oxygen Delivery Method): room air        GEN: NAD  ABD: soft, NT/ND; no rebound or guarding;  Neurologic: AAOx3; pleasantly confused.   RLE:  Motor intact + EHL/FHL/TA/GS. Sensation is grossly intact.  Extremities warm. . Compartments soft, compressible. No calf tenderness. DP 2+.  LLE:  Motor intact + EHL/FHL/TA/GS. Sensation is grossly intact. Extremities warm. Compartments soft, compressible. No calf tenderness.. DP 2+.    Labs:                          11.1   10.03 )-----------( 195      ( 19 Nov 2024 06:30 )             32.5       11-19    134[L]  |  103  |  20  ----------------------------<  141[H]  4.1   |  25  |  1.10    Ca    8.5      19 Nov 2024 06:30        A/P: Patient is a 84y y/o Male s/p Right Anterior SANDOR, POD # 1  -wound care,   GI motility, new medications,  hospital course and discharge planning reviewed with pt son at bedside  -Pain control/analgesia reviewed with pt son  -Inc spirometry   -Venodynes/foot pumps  -PT/OT/WBAT  -Anticoagulation asa/brelinta  -Medical consult reviewed  -DC planning for rehab   -D/W DR fisher  
Patient is s/p R Anterior SANDOR POD#3  Pt tolerated procedure well without any intra-op complications.   Pt doing well at this time. Pain is controlled.   Denies CP/SOB/Dizziness/N/V/D/HA.     Vital Signs Last 24 Hrs  T(C): 36.9 (21 Nov 2024 10:57), Max: 36.9 (21 Nov 2024 10:57)  T(F): 98.5 (21 Nov 2024 10:57), Max: 98.5 (21 Nov 2024 10:57)  HR: 78 (21 Nov 2024 10:57) (77 - 81)  BP: 130/68 (21 Nov 2024 10:57) (103/64 - 150/76)  BP(mean): --  RR: 19 (21 Nov 2024 10:57) (18 - 19)  SpO2: 97% (21 Nov 2024 10:57) (93% - 97%)    Parameters below as of 21 Nov 2024 10:57  Patient On (Oxygen Delivery Method): room air        PE:  General: A&Ox3 NAD  RLE: Dressing C/D/I. Motor intact + EHL/FHL/TA/GS. Sensation is grossly intact. Extremity warm. Compartments soft, compressible, no calf tenderness. DP 2+   LLE: Motor intact + EHL/FHL/TA/GS. Sensation is grossly intact. Extremity warm. Compartments soft, compressible, no calf tenderness. DP 2+     Labs:                A/P: Patient is a 84y y/o Male s/p R Anterior SANDOR, POD #3  -wound care, isometric exercises, GI motility, new medications, hospital course reviewed with pt  -Pain control/analgesia  -Inc spirometry reviewed and counseled  -DVT ppx with venodynes and ASA 81 BID starting POD#1  -PT/OT/WBAT, Anterior hip precautions,   -Antibiotic post op  -Medical consult  -Discharge: Rehab  Pt requires rolling walker to perform MRADLs at home.

## 2024-11-26 DIAGNOSIS — I25.2 OLD MYOCARDIAL INFARCTION: ICD-10-CM

## 2024-11-26 DIAGNOSIS — I35.1 NONRHEUMATIC AORTIC (VALVE) INSUFFICIENCY: ICD-10-CM

## 2024-11-26 DIAGNOSIS — I10 ESSENTIAL (PRIMARY) HYPERTENSION: ICD-10-CM

## 2024-11-26 DIAGNOSIS — Z95.5 PRESENCE OF CORONARY ANGIOPLASTY IMPLANT AND GRAFT: ICD-10-CM

## 2024-11-26 DIAGNOSIS — F01.50 VASCULAR DEMENTIA, UNSPECIFIED SEVERITY, WITHOUT BEHAVIORAL DISTURBANCE, PSYCHOTIC DISTURBANCE, MOOD DISTURBANCE, AND ANXIETY: ICD-10-CM

## 2024-11-26 DIAGNOSIS — M16.11 UNILATERAL PRIMARY OSTEOARTHRITIS, RIGHT HIP: ICD-10-CM

## 2024-11-26 DIAGNOSIS — I25.10 ATHEROSCLEROTIC HEART DISEASE OF NATIVE CORONARY ARTERY WITHOUT ANGINA PECTORIS: ICD-10-CM

## 2024-11-26 DIAGNOSIS — F32.A DEPRESSION, UNSPECIFIED: ICD-10-CM

## 2024-11-26 DIAGNOSIS — N40.0 BENIGN PROSTATIC HYPERPLASIA WITHOUT LOWER URINARY TRACT SYMPTOMS: ICD-10-CM

## 2024-12-05 ENCOUNTER — APPOINTMENT (OUTPATIENT)
Dept: ORTHOPEDIC SURGERY | Facility: CLINIC | Age: 84
End: 2024-12-05
Payer: MEDICARE

## 2024-12-05 DIAGNOSIS — Z96.641 PRESENCE OF RIGHT ARTIFICIAL HIP JOINT: ICD-10-CM

## 2024-12-05 PROCEDURE — 99024 POSTOP FOLLOW-UP VISIT: CPT

## 2024-12-05 PROCEDURE — 73503 X-RAY EXAM HIP UNI 4/> VIEWS: CPT | Mod: 26,RT

## 2024-12-18 ENCOUNTER — TRANSCRIPTION ENCOUNTER (OUTPATIENT)
Age: 84
End: 2024-12-18

## 2025-01-09 ENCOUNTER — APPOINTMENT (OUTPATIENT)
Dept: ORTHOPEDIC SURGERY | Facility: CLINIC | Age: 85
End: 2025-01-09

## 2025-06-08 ENCOUNTER — INPATIENT (INPATIENT)
Facility: HOSPITAL | Age: 85
LOS: 4 days | Discharge: NURSING, REHAB OR LONG-TERM CARE | DRG: 948 | End: 2025-06-13
Attending: INTERNAL MEDICINE | Admitting: HOSPITALIST
Payer: OTHER GOVERNMENT

## 2025-06-08 VITALS
DIASTOLIC BLOOD PRESSURE: 56 MMHG | TEMPERATURE: 98 F | OXYGEN SATURATION: 95 % | HEART RATE: 78 BPM | SYSTOLIC BLOOD PRESSURE: 135 MMHG | RESPIRATION RATE: 16 BRPM

## 2025-06-08 DIAGNOSIS — R41.82 ALTERED MENTAL STATUS, UNSPECIFIED: ICD-10-CM

## 2025-06-08 DIAGNOSIS — Z98.890 OTHER SPECIFIED POSTPROCEDURAL STATES: Chronic | ICD-10-CM

## 2025-06-08 DIAGNOSIS — Z90.89 ACQUIRED ABSENCE OF OTHER ORGANS: Chronic | ICD-10-CM

## 2025-06-08 LAB
ALBUMIN SERPL ELPH-MCNC: 3.1 G/DL — LOW (ref 3.3–5)
ALP SERPL-CCNC: 61 U/L — SIGNIFICANT CHANGE UP (ref 40–120)
ALT FLD-CCNC: 24 U/L — SIGNIFICANT CHANGE UP (ref 12–78)
ANION GAP SERPL CALC-SCNC: 6 MMOL/L — SIGNIFICANT CHANGE UP (ref 5–17)
APPEARANCE UR: ABNORMAL
APTT BLD: 31.3 SEC — SIGNIFICANT CHANGE UP (ref 26.1–36.8)
AST SERPL-CCNC: 15 U/L — SIGNIFICANT CHANGE UP (ref 15–37)
BACTERIA # UR AUTO: ABNORMAL /HPF
BASOPHILS # BLD AUTO: 0.01 K/UL — SIGNIFICANT CHANGE UP (ref 0–0.2)
BASOPHILS NFR BLD AUTO: 0.1 % — SIGNIFICANT CHANGE UP (ref 0–2)
BILIRUB SERPL-MCNC: 1.3 MG/DL — HIGH (ref 0.2–1.2)
BILIRUB UR-MCNC: NEGATIVE — SIGNIFICANT CHANGE UP
BUN SERPL-MCNC: 26 MG/DL — HIGH (ref 7–23)
CALCIUM SERPL-MCNC: 8.7 MG/DL — SIGNIFICANT CHANGE UP (ref 8.5–10.1)
CAST: 4 /LPF — SIGNIFICANT CHANGE UP (ref 0–4)
CHLORIDE SERPL-SCNC: 108 MMOL/L — SIGNIFICANT CHANGE UP (ref 96–108)
CO2 SERPL-SCNC: 23 MMOL/L — SIGNIFICANT CHANGE UP (ref 22–31)
COLOR SPEC: SIGNIFICANT CHANGE UP
CREAT SERPL-MCNC: 1.38 MG/DL — HIGH (ref 0.5–1.3)
DIFF PNL FLD: ABNORMAL
EGFR: 50 ML/MIN/1.73M2 — LOW
EGFR: 50 ML/MIN/1.73M2 — LOW
EOSINOPHIL # BLD AUTO: 0 K/UL — SIGNIFICANT CHANGE UP (ref 0–0.5)
EOSINOPHIL NFR BLD AUTO: 0 % — SIGNIFICANT CHANGE UP (ref 0–6)
FLUAV AG NPH QL: SIGNIFICANT CHANGE UP
FLUBV AG NPH QL: SIGNIFICANT CHANGE UP
GLUCOSE BLDC GLUCOMTR-MCNC: 145 MG/DL — HIGH (ref 70–99)
GLUCOSE BLDC GLUCOMTR-MCNC: 191 MG/DL — HIGH (ref 70–99)
GLUCOSE SERPL-MCNC: 242 MG/DL — HIGH (ref 70–99)
GLUCOSE UR QL: NEGATIVE MG/DL — SIGNIFICANT CHANGE UP
HCT VFR BLD CALC: 37.2 % — LOW (ref 39–50)
HGB BLD-MCNC: 12.2 G/DL — LOW (ref 13–17)
IMM GRANULOCYTES # BLD AUTO: 0.05 K/UL — SIGNIFICANT CHANGE UP (ref 0–0.07)
IMM GRANULOCYTES NFR BLD AUTO: 0.5 % — SIGNIFICANT CHANGE UP (ref 0–0.9)
INR BLD: 1.21 RATIO — HIGH (ref 0.85–1.16)
KETONES UR QL: ABNORMAL MG/DL
LACTATE SERPL-SCNC: 1.7 MMOL/L — SIGNIFICANT CHANGE UP (ref 0.7–2)
LACTATE SERPL-SCNC: 2.9 MMOL/L — HIGH (ref 0.7–2)
LEUKOCYTE ESTERASE UR-ACNC: ABNORMAL
LYMPHOCYTES # BLD AUTO: 0.3 K/UL — LOW (ref 1–3.3)
LYMPHOCYTES NFR BLD AUTO: 2.8 % — LOW (ref 13–44)
MCHC RBC-ENTMCNC: 28.8 PG — SIGNIFICANT CHANGE UP (ref 27–34)
MCHC RBC-ENTMCNC: 32.8 G/DL — SIGNIFICANT CHANGE UP (ref 32–36)
MCV RBC AUTO: 87.9 FL — SIGNIFICANT CHANGE UP (ref 80–100)
MONOCYTES # BLD AUTO: 0.6 K/UL — SIGNIFICANT CHANGE UP (ref 0–0.9)
MONOCYTES NFR BLD AUTO: 5.7 % — SIGNIFICANT CHANGE UP (ref 2–14)
NEUTROPHILS # BLD AUTO: 9.57 K/UL — HIGH (ref 1.8–7.4)
NEUTROPHILS NFR BLD AUTO: 90.9 % — HIGH (ref 43–77)
NITRITE UR-MCNC: POSITIVE
NRBC # BLD AUTO: 0 K/UL — SIGNIFICANT CHANGE UP (ref 0–0)
NRBC # FLD: 0 K/UL — SIGNIFICANT CHANGE UP (ref 0–0)
NRBC BLD AUTO-RTO: 0 /100 WBCS — SIGNIFICANT CHANGE UP (ref 0–0)
PH UR: 5 — SIGNIFICANT CHANGE UP (ref 5–8)
PLATELET # BLD AUTO: 138 K/UL — LOW (ref 150–400)
PMV BLD: 10.3 FL — SIGNIFICANT CHANGE UP (ref 7–13)
POTASSIUM SERPL-MCNC: 3.9 MMOL/L — SIGNIFICANT CHANGE UP (ref 3.5–5.3)
POTASSIUM SERPL-SCNC: 3.9 MMOL/L — SIGNIFICANT CHANGE UP (ref 3.5–5.3)
PROT SERPL-MCNC: 6.4 GM/DL — SIGNIFICANT CHANGE UP (ref 6–8.3)
PROT UR-MCNC: 300 MG/DL
PROTHROM AB SERPL-ACNC: 14.3 SEC — HIGH (ref 9.9–13.4)
RBC # BLD: 4.23 M/UL — SIGNIFICANT CHANGE UP (ref 4.2–5.8)
RBC # FLD: 14.2 % — SIGNIFICANT CHANGE UP (ref 10.3–14.5)
RBC CASTS # UR COMP ASSIST: 1 /HPF — SIGNIFICANT CHANGE UP (ref 0–4)
RSV RNA NPH QL NAA+NON-PROBE: SIGNIFICANT CHANGE UP
SARS-COV-2 RNA SPEC QL NAA+PROBE: SIGNIFICANT CHANGE UP
SODIUM SERPL-SCNC: 137 MMOL/L — SIGNIFICANT CHANGE UP (ref 135–145)
SOURCE RESPIRATORY: SIGNIFICANT CHANGE UP
SP GR SPEC: 1.02 — SIGNIFICANT CHANGE UP (ref 1–1.03)
SQUAMOUS # UR AUTO: 4 /HPF — SIGNIFICANT CHANGE UP (ref 0–5)
TROPONIN I, HIGH SENSITIVITY RESULT: 105.28 NG/L — HIGH
TROPONIN I, HIGH SENSITIVITY RESULT: 81.41 NG/L — HIGH
TROPONIN I, HIGH SENSITIVITY RESULT: 90.73 NG/L — HIGH
UROBILINOGEN FLD QL: 1 MG/DL — SIGNIFICANT CHANGE UP (ref 0.2–1)
WBC # BLD: 10.53 K/UL — HIGH (ref 3.8–10.5)
WBC # FLD AUTO: 10.53 K/UL — HIGH (ref 3.8–10.5)
WBC UR QL: 846 /HPF — HIGH (ref 0–5)

## 2025-06-08 PROCEDURE — 36415 COLL VENOUS BLD VENIPUNCTURE: CPT

## 2025-06-08 PROCEDURE — 99223 1ST HOSP IP/OBS HIGH 75: CPT

## 2025-06-08 PROCEDURE — 82962 GLUCOSE BLOOD TEST: CPT

## 2025-06-08 PROCEDURE — 84484 ASSAY OF TROPONIN QUANT: CPT

## 2025-06-08 PROCEDURE — 70450 CT HEAD/BRAIN W/O DYE: CPT | Mod: 26

## 2025-06-08 PROCEDURE — 93306 TTE W/DOPPLER COMPLETE: CPT | Mod: 26

## 2025-06-08 PROCEDURE — 97162 PT EVAL MOD COMPLEX 30 MIN: CPT | Mod: GP

## 2025-06-08 PROCEDURE — 87899 AGENT NOS ASSAY W/OPTIC: CPT

## 2025-06-08 PROCEDURE — 99285 EMERGENCY DEPT VISIT HI MDM: CPT

## 2025-06-08 PROCEDURE — 80048 BASIC METABOLIC PNL TOTAL CA: CPT

## 2025-06-08 PROCEDURE — 85027 COMPLETE CBC AUTOMATED: CPT

## 2025-06-08 PROCEDURE — 97116 GAIT TRAINING THERAPY: CPT | Mod: GP

## 2025-06-08 PROCEDURE — 93005 ELECTROCARDIOGRAM TRACING: CPT

## 2025-06-08 PROCEDURE — 93010 ELECTROCARDIOGRAM REPORT: CPT

## 2025-06-08 PROCEDURE — 86738 MYCOPLASMA ANTIBODY: CPT

## 2025-06-08 PROCEDURE — 97530 THERAPEUTIC ACTIVITIES: CPT | Mod: GP

## 2025-06-08 PROCEDURE — 71045 X-RAY EXAM CHEST 1 VIEW: CPT | Mod: 26

## 2025-06-08 PROCEDURE — 83036 HEMOGLOBIN GLYCOSYLATED A1C: CPT

## 2025-06-08 PROCEDURE — 83605 ASSAY OF LACTIC ACID: CPT

## 2025-06-08 RX ORDER — DONEPEZIL HYDROCHLORIDE 5 MG/1
10 TABLET ORAL AT BEDTIME
Refills: 0 | Status: DISCONTINUED | OUTPATIENT
Start: 2025-06-08 | End: 2025-06-13

## 2025-06-08 RX ORDER — INSULIN LISPRO 100 U/ML
INJECTION, SOLUTION INTRAVENOUS; SUBCUTANEOUS AT BEDTIME
Refills: 0 | Status: DISCONTINUED | OUTPATIENT
Start: 2025-06-08 | End: 2025-06-12

## 2025-06-08 RX ORDER — DOXYCYCLINE HYCLATE 100 MG
TABLET ORAL
Refills: 0 | Status: DISCONTINUED | OUTPATIENT
Start: 2025-06-08 | End: 2025-06-13

## 2025-06-08 RX ORDER — ASPIRIN 325 MG
81 TABLET ORAL DAILY
Refills: 0 | Status: DISCONTINUED | OUTPATIENT
Start: 2025-06-08 | End: 2025-06-13

## 2025-06-08 RX ORDER — DEXTROSE 50 % IN WATER 50 %
25 SYRINGE (ML) INTRAVENOUS ONCE
Refills: 0 | Status: DISCONTINUED | OUTPATIENT
Start: 2025-06-08 | End: 2025-06-13

## 2025-06-08 RX ORDER — SODIUM CHLORIDE 9 G/1000ML
1000 INJECTION, SOLUTION INTRAVENOUS
Refills: 0 | Status: DISCONTINUED | OUTPATIENT
Start: 2025-06-08 | End: 2025-06-13

## 2025-06-08 RX ORDER — INSULIN LISPRO 100 U/ML
INJECTION, SOLUTION INTRAVENOUS; SUBCUTANEOUS
Refills: 0 | Status: DISCONTINUED | OUTPATIENT
Start: 2025-06-08 | End: 2025-06-12

## 2025-06-08 RX ORDER — SENNA 187 MG
2 TABLET ORAL AT BEDTIME
Refills: 0 | Status: DISCONTINUED | OUTPATIENT
Start: 2025-06-08 | End: 2025-06-13

## 2025-06-08 RX ORDER — VENLAFAXINE HYDROCHLORIDE 37.5 MG/1
75 CAPSULE, EXTENDED RELEASE ORAL DAILY
Refills: 0 | Status: DISCONTINUED | OUTPATIENT
Start: 2025-06-08 | End: 2025-06-13

## 2025-06-08 RX ORDER — MELATONIN 5 MG
3 TABLET ORAL AT BEDTIME
Refills: 0 | Status: DISCONTINUED | OUTPATIENT
Start: 2025-06-08 | End: 2025-06-13

## 2025-06-08 RX ORDER — POLYETHYLENE GLYCOL 3350 17 G/17G
17 POWDER, FOR SOLUTION ORAL AT BEDTIME
Refills: 0 | Status: DISCONTINUED | OUTPATIENT
Start: 2025-06-08 | End: 2025-06-13

## 2025-06-08 RX ORDER — MAGNESIUM, ALUMINUM HYDROXIDE 200-200 MG
30 TABLET,CHEWABLE ORAL EVERY 4 HOURS
Refills: 0 | Status: DISCONTINUED | OUTPATIENT
Start: 2025-06-08 | End: 2025-06-13

## 2025-06-08 RX ORDER — ONDANSETRON HCL/PF 4 MG/2 ML
4 VIAL (ML) INJECTION EVERY 8 HOURS
Refills: 0 | Status: DISCONTINUED | OUTPATIENT
Start: 2025-06-08 | End: 2025-06-13

## 2025-06-08 RX ORDER — ACETAMINOPHEN 500 MG/5ML
1000 LIQUID (ML) ORAL ONCE
Refills: 0 | Status: COMPLETED | OUTPATIENT
Start: 2025-06-08 | End: 2025-06-08

## 2025-06-08 RX ORDER — FINASTERIDE 1 MG/1
5 TABLET, FILM COATED ORAL DAILY
Refills: 0 | Status: DISCONTINUED | OUTPATIENT
Start: 2025-06-08 | End: 2025-06-13

## 2025-06-08 RX ORDER — GLUCAGON 3 MG/1
1 POWDER NASAL ONCE
Refills: 0 | Status: DISCONTINUED | OUTPATIENT
Start: 2025-06-08 | End: 2025-06-13

## 2025-06-08 RX ORDER — ACETAMINOPHEN 500 MG/5ML
650 LIQUID (ML) ORAL EVERY 6 HOURS
Refills: 0 | Status: DISCONTINUED | OUTPATIENT
Start: 2025-06-08 | End: 2025-06-13

## 2025-06-08 RX ORDER — DORZOLAMIDE 20 MG/ML
1 SOLUTION/ DROPS OPHTHALMIC THREE TIMES A DAY
Refills: 0 | Status: DISCONTINUED | OUTPATIENT
Start: 2025-06-08 | End: 2025-06-13

## 2025-06-08 RX ORDER — CELECOXIB 50 MG/1
200 CAPSULE ORAL EVERY 12 HOURS
Refills: 0 | Status: DISCONTINUED | OUTPATIENT
Start: 2025-06-08 | End: 2025-06-13

## 2025-06-08 RX ORDER — MEMANTINE HYDROCHLORIDE 21 MG/1
10 CAPSULE, EXTENDED RELEASE ORAL
Refills: 0 | Status: DISCONTINUED | OUTPATIENT
Start: 2025-06-08 | End: 2025-06-13

## 2025-06-08 RX ORDER — AMLODIPINE BESYLATE 10 MG/1
5 TABLET ORAL DAILY
Refills: 0 | Status: DISCONTINUED | OUTPATIENT
Start: 2025-06-08 | End: 2025-06-13

## 2025-06-08 RX ORDER — DEXTROSE 50 % IN WATER 50 %
12.5 SYRINGE (ML) INTRAVENOUS ONCE
Refills: 0 | Status: DISCONTINUED | OUTPATIENT
Start: 2025-06-08 | End: 2025-06-13

## 2025-06-08 RX ORDER — TAMSULOSIN HYDROCHLORIDE 0.4 MG/1
0.4 CAPSULE ORAL AT BEDTIME
Refills: 0 | Status: DISCONTINUED | OUTPATIENT
Start: 2025-06-08 | End: 2025-06-13

## 2025-06-08 RX ORDER — DEXTROSE 50 % IN WATER 50 %
15 SYRINGE (ML) INTRAVENOUS ONCE
Refills: 0 | Status: DISCONTINUED | OUTPATIENT
Start: 2025-06-08 | End: 2025-06-13

## 2025-06-08 RX ORDER — DOXYCYCLINE HYCLATE 100 MG
100 TABLET ORAL ONCE
Refills: 0 | Status: COMPLETED | OUTPATIENT
Start: 2025-06-08 | End: 2025-06-08

## 2025-06-08 RX ORDER — CEFTRIAXONE 500 MG/1
1000 INJECTION, POWDER, FOR SOLUTION INTRAMUSCULAR; INTRAVENOUS ONCE
Refills: 0 | Status: COMPLETED | OUTPATIENT
Start: 2025-06-08 | End: 2025-06-08

## 2025-06-08 RX ORDER — CEFTRIAXONE 500 MG/1
1000 INJECTION, POWDER, FOR SOLUTION INTRAMUSCULAR; INTRAVENOUS EVERY 24 HOURS
Refills: 0 | Status: DISCONTINUED | OUTPATIENT
Start: 2025-06-09 | End: 2025-06-09

## 2025-06-08 RX ORDER — CEFTRIAXONE 500 MG/1
1000 INJECTION, POWDER, FOR SOLUTION INTRAMUSCULAR; INTRAVENOUS ONCE
Refills: 0 | Status: DISCONTINUED | OUTPATIENT
Start: 2025-06-08 | End: 2025-06-08

## 2025-06-08 RX ORDER — ATORVASTATIN CALCIUM 80 MG/1
10 TABLET, FILM COATED ORAL AT BEDTIME
Refills: 0 | Status: DISCONTINUED | OUTPATIENT
Start: 2025-06-08 | End: 2025-06-13

## 2025-06-08 RX ORDER — DOXYCYCLINE HYCLATE 100 MG
100 TABLET ORAL EVERY 12 HOURS
Refills: 0 | Status: DISCONTINUED | OUTPATIENT
Start: 2025-06-09 | End: 2025-06-13

## 2025-06-08 RX ORDER — HEPARIN SODIUM 1000 [USP'U]/ML
5000 INJECTION INTRAVENOUS; SUBCUTANEOUS EVERY 8 HOURS
Refills: 0 | Status: DISCONTINUED | OUTPATIENT
Start: 2025-06-08 | End: 2025-06-13

## 2025-06-08 RX ORDER — BRIMONIDINE TARTRATE 1.5 MG/ML
1 SOLUTION/ DROPS OPHTHALMIC EVERY 12 HOURS
Refills: 0 | Status: DISCONTINUED | OUTPATIENT
Start: 2025-06-08 | End: 2025-06-13

## 2025-06-08 RX ORDER — DESMOPRESSIN ACETATE 4 UG/ML
0.1 INJECTION INTRAVENOUS AT BEDTIME
Refills: 0 | Status: DISCONTINUED | OUTPATIENT
Start: 2025-06-08 | End: 2025-06-13

## 2025-06-08 RX ADMIN — Medication 110 MILLIGRAM(S): at 14:26

## 2025-06-08 RX ADMIN — POLYETHYLENE GLYCOL 3350 17 GRAM(S): 17 POWDER, FOR SOLUTION ORAL at 21:54

## 2025-06-08 RX ADMIN — Medication 2 TABLET(S): at 21:41

## 2025-06-08 RX ADMIN — DESMOPRESSIN ACETATE 0.1 MILLIGRAM(S): 4 INJECTION INTRAVENOUS at 21:42

## 2025-06-08 RX ADMIN — HEPARIN SODIUM 5000 UNIT(S): 1000 INJECTION INTRAVENOUS; SUBCUTANEOUS at 21:42

## 2025-06-08 RX ADMIN — DONEPEZIL HYDROCHLORIDE 10 MILLIGRAM(S): 5 TABLET ORAL at 21:41

## 2025-06-08 RX ADMIN — BRIMONIDINE TARTRATE 1 DROP(S): 1.5 SOLUTION/ DROPS OPHTHALMIC at 23:26

## 2025-06-08 RX ADMIN — MEMANTINE HYDROCHLORIDE 10 MILLIGRAM(S): 21 CAPSULE, EXTENDED RELEASE ORAL at 21:42

## 2025-06-08 RX ADMIN — CELECOXIB 200 MILLIGRAM(S): 50 CAPSULE ORAL at 22:00

## 2025-06-08 RX ADMIN — ATORVASTATIN CALCIUM 10 MILLIGRAM(S): 80 TABLET, FILM COATED ORAL at 21:42

## 2025-06-08 RX ADMIN — Medication 3 MILLIGRAM(S): at 21:41

## 2025-06-08 RX ADMIN — Medication 1000 MILLILITER(S): at 11:06

## 2025-06-08 RX ADMIN — DORZOLAMIDE 1 DROP(S): 20 SOLUTION/ DROPS OPHTHALMIC at 23:26

## 2025-06-08 RX ADMIN — CELECOXIB 200 MILLIGRAM(S): 50 CAPSULE ORAL at 21:42

## 2025-06-08 RX ADMIN — Medication 400 MILLIGRAM(S): at 11:53

## 2025-06-08 RX ADMIN — TAMSULOSIN HYDROCHLORIDE 0.4 MILLIGRAM(S): 0.4 CAPSULE ORAL at 21:41

## 2025-06-08 RX ADMIN — Medication 50 MILLILITER(S): at 14:29

## 2025-06-08 RX ADMIN — Medication 400 MILLIGRAM(S): at 17:39

## 2025-06-08 RX ADMIN — HEPARIN SODIUM 5000 UNIT(S): 1000 INJECTION INTRAVENOUS; SUBCUTANEOUS at 14:26

## 2025-06-08 RX ADMIN — Medication 1000 MILLILITER(S): at 12:22

## 2025-06-08 RX ADMIN — CEFTRIAXONE 1000 MILLIGRAM(S): 500 INJECTION, POWDER, FOR SOLUTION INTRAMUSCULAR; INTRAVENOUS at 11:53

## 2025-06-08 NOTE — ED ADULT TRIAGE NOTE - GLASGOW COMA SCALE: BEST MOTOR RESPONSE, MLM
Patient: Navin Teresa    Procedure: Procedure(s):  CYSTOURETEROSCOPY, WITH LEFT RETROGRADE PYELOGRAM AND LEFT URETERAL STENT INSERTION       Anesthesia Type:  MAC    Note:  Disposition: Outpatient   Postop Pain Control: Uneventful            Sign Out: Well controlled pain   PONV: No   Neuro/Psych: Uneventful            Sign Out: Acceptable/Baseline neuro status   Airway/Respiratory: Uneventful            Sign Out: Acceptable/Baseline resp. status   CV/Hemodynamics: Uneventful            Sign Out: Acceptable CV status; No obvious hypovolemia; No obvious fluid overload   Other NRE: NONE   DID A NON-ROUTINE EVENT OCCUR? No    Event details/Postop Comments:  Patient recovering comfortably.        Last vitals:  Vitals:    02/04/24 0945 02/04/24 1208 02/04/24 1233   BP:  96/53 121/60   Pulse: 68 67 66   Resp:  24 24   Temp:  37.9  C (100.2  F) 37.6  C (99.7  F)   SpO2: 90% 94% 91%       Electronically Signed By: Tushar Jones DO  February 4, 2024  12:37 PM   (M6) obeys commands

## 2025-06-08 NOTE — ED PROVIDER NOTE - OBJECTIVE STATEMENT
86 y/o male with PMHx of HLD, HTN, DM, CAD s/p CABG, depression, BPH, vascular dementia ; presents to ED from [facility] c/o AMS. Per EMS, pt has been more confused than baseline since he woke up. Pt states he is here because he has been too weak to walk. Denies any falls, pain. HPI limited d/t dementia and AMS.    DNR on file

## 2025-06-08 NOTE — H&P ADULT - ASSESSMENT
#Sepsis - poa  #UTI/PNA will treat as CAP  #Suspect demand ischemia in setting of sepsis  #JOSEE  #Metabolic encephalopathy / dementia  - admit to med surg with remote tele  - CTH : negative  - trend trops q8h  - DAPT + statin for now - per records, last cath 1/2024, (patent Lcx stent, RCA stent deployed - on dapt)  - TTE  - EKG w/o dynamic changes  - Cont abx and add doxycycline  - legionella/mycoplasma/strep ags  - reflex lactate in 4 hours and tx accordingly  - blood/urine  cultures  - gentle ivf  - cbc/bmp in am  - hold nephrotoxins  - bladder scan  - Fall precautions/frequent re orientation  - CBC/BMP in am  - cardio consult: Dr Oseguera       Time spent:  80 min spent during this encounter including but not limited to chart review, external record review,  labs/imaging (both on independent interpretation and official review by radiology), medication reconciliation,  coordination of care and discussion with consultants. (Not including goc discussion)  Further tests such as imaging and blood test, medications, and procedures have been ordered as indicated. Laboratory results and the plan of care were communicated to the patient and family.    Time spent was including but not limited to:  - Reviewing, and interpreting labs and testing.  - Independently obtaining a review of systems and performing a physical exam  - Reviewing consultant documentation/recommendations in addition to discussing plan of care with consultants.  - Counselling and educating patient and family regarding interpretation of aforementioned items and plan of care.

## 2025-06-08 NOTE — ED ADULT NURSE NOTE - CHIEF COMPLAINT QUOTE
ramez from [facility], sent in for AMS since this morning. per EMS, patient is AOx3 at baseline, now more confused since he woke up. in triage patient  is with not focal neurological findings. BFAST -  hx vascular dementia.  en route

## 2025-06-08 NOTE — ED PROVIDER NOTE - PHYSICAL EXAMINATION
Constitutional: NAD AOx1  Eyes: PERRL EOMI  Head: Normocephalic atraumatic  Mouth: MMM  Cardiac: regular rate and rhythm  Resp: Lungs CTAB  GI: Abd s/nd/nt  Neuro: CN2-12 grossly intact, RICHMOND x 4  Skin: No visible rashes

## 2025-06-08 NOTE — ED PROVIDER NOTE - PROGRESS NOTE DETAILS
RN at [facility] stated pt was more confused than usual and had an unsteady gait. MD DENISE

## 2025-06-08 NOTE — ED ADULT NURSE NOTE - NSFALLHARMRISKINTERV_ED_ALL_ED

## 2025-06-08 NOTE — PATIENT PROFILE ADULT - FUNCTIONAL SCREEN CURRENT LEVEL: COMMUNICATION, MLM
pt confused, hx fo vascular dementia/2 = difficulty understanding and speaking (not related to language barrier)

## 2025-06-08 NOTE — ED PROVIDER NOTE - NSICDXPASTMEDICALHX_GEN_ALL_CORE_FT
PAST MEDICAL HISTORY:  CAD (coronary artery disease)     Depression     Glaucoma     History of BPH     HLD (hyperlipidemia)

## 2025-06-08 NOTE — H&P ADULT - NSHPLABSRESULTS_GEN_ALL_CORE
LABS: All Labs Reviewed:                        12.2   10.53 )-----------( 138      ( 08 Jun 2025 11:00 )             37.2     06-08    137  |  108  |  26[H]  ----------------------------<  242[H]  3.9   |  23  |  1.38[H]    Ca    8.7      08 Jun 2025 11:00    TPro  6.4  /  Alb  3.1[L]  /  TBili  1.3[H]  /  DBili  x   /  AST  15  /  ALT  24  /  AlkPhos  61  06-08    PT/INR - ( 08 Jun 2025 11:00 )   PT: 14.3 sec;   INR: 1.21 ratio         PTT - ( 08 Jun 2025 11:00 )  PTT:31.3 sec          Blood Culture:

## 2025-06-08 NOTE — H&P ADULT - HISTORY OF PRESENT ILLNESS
"84 y/o male with PMHx of HLD, HTN, DM, CAD s/p CABG, depression, BPH, vascular dementia ; presents to ED from [facility] c/o AMS. Per EMS, pt has been more confused than baseline since he woke up. Pt states he is here because he has been too weak to walk. Denies any falls, pain. HPI limited d/t dementia and AMS."    ED course: pt found to meet sepsis criteria 2/2 to LLL pna and uti - not able to provide collateral info d/t dementia.   Elevated trops in setting of sepsis. TNI: 81  Scr: 1.4  Lactate: 2.9  CTH: negative  CXr: + pna  RVP: negative  Rec'd CFTX   2L NS provided in ED  Febrile to 102 s/p ofirmev  EKst degree avb (unchanged from prior) no stt changes  Vitals otherwise stable        PAST MEDICAL/SURGICAL/FAMILY/SOCIAL HISTORY:    Past Medical, Past Surgical, and Family History:  PAST MEDICAL HISTORY:  CAD (coronary artery disease)     Depression     Glaucoma     History of BPH     HLD (hyperlipidemia).     PAST SURGICAL HISTORY:  History of tonsillectomy     Previous back surgery.     FAMILY HISTORY:  Father  Still living? Unknown  Patient unable to provide medical history, Age at diagnosis: Age Unknown    Mother  Still living? Unknown  Patient unable to provide medical history, Age at diagnosis: Age Unknown.     Tobacco Usage:  · Tobacco Usage	Unknown if ever smoked    ALLERGIES AND HOME MEDICATIONS:   Allergies:        Allergies:  	No Known Allergies:

## 2025-06-08 NOTE — ED PROVIDER NOTE - CLINICAL SUMMARY MEDICAL DECISION MAKING FREE TEXT BOX
Patient with new weakness, worsening confusion per [facility]. Plan labs, UA, chest x-ray, viral swab and re-eval.

## 2025-06-08 NOTE — PATIENT PROFILE ADULT - NSPROGENPREVTRANSF_GEN_A_NUR
unable to assess/no history of blood product transfusion unable to assess, pt confused, hx fo vascular dementia

## 2025-06-08 NOTE — PATIENT PROFILE ADULT - FUNCTIONAL ASSESSMENT - BASIC MOBILITY 6.
2-calculated by average/Not able to assess (calculate score using [facility] averaging method)

## 2025-06-08 NOTE — PATIENT PROFILE ADULT - FALL HARM RISK - HARM RISK INTERVENTIONS

## 2025-06-08 NOTE — PATIENT PROFILE ADULT - NS PRO AD PATIENT TYPE ON CHART
non-hospital DNR, MD called notified/Health Care Proxy (HCP)/Do Not Resuscitate (DNR) non-hospital DNR, MD called notified. As per Dr Greene was unable to contact daughter HCP, message left/Health Care Proxy (HCP)/Do Not Resuscitate (DNR)

## 2025-06-08 NOTE — H&P ADULT - NSHPPHYSICALEXAM_GEN_ALL_CORE
ICU Vital Signs Last 24 Hrs  T(C): 38.9 (08 Jun 2025 10:55), Max: 38.9 (08 Jun 2025 10:55)  T(F): 102.1 (08 Jun 2025 10:55), Max: 102.1 (08 Jun 2025 10:55)  HR: 66 (08 Jun 2025 12:30) (66 - 78)  BP: 114/66 (08 Jun 2025 12:30) (102/75 - 135/56)  BP(mean): 81 (08 Jun 2025 12:30) (71 - 83)  ABP: --  ABP(mean): --  RR: 18 (08 Jun 2025 12:30) (16 - 18)  SpO2: 94% (08 Jun 2025 12:30) (94% - 96%)    O2 Parameters below as of 08 Jun 2025 12:30  Patient On (Oxygen Delivery Method): room air            PHYSICAL EXAM:    Constitutional: NAD, awake   HEENT: PERR, EOMI, Normal Hearing, MMM  Neck: Soft and supple, No LAD, No JVD  Respiratory: Breath sounds are clear bilaterally, No wheezing, rales or rhonchi  Cardiovascular: S1 and S2, regular rate and rhythm, no Murmurs, gallops or rubs  Gastrointestinal: Bowel Sounds present, soft, nontender, nondistended, no guarding, no rebound  Extremities: No peripheral edema  Vascular: 2+ peripheral pulses  Neurological: A/O x 1, no focal deficits  Musculoskeletal: 5/5 strength b/l upper and lower extremities  Skin: No rashes

## 2025-06-08 NOTE — ED PROVIDER NOTE - CARE PLAN
1 Principal Discharge DX:	[facility] (altered mental status)   Principal Discharge DX:	[facility] (altered mental status)  Secondary Diagnosis:	Acute UTI  Secondary Diagnosis:	Pneumonia

## 2025-06-08 NOTE — ED ADULT TRIAGE NOTE - CHIEF COMPLAINT QUOTE
ramez from [facility], sent in for AMS since this morning. per EMS, patient is AOx3 at baseline, now more confused since he woke up. in triage patient  is with not focal neurological findings. BFAST -  hx vascular dementia.  en route

## 2025-06-08 NOTE — ED ADULT NURSE NOTE - OBJECTIVE STATEMENT
Pt sent in by [facility] for AMS starting when he woke up the am. pt with history of vascular dementia, cad, cabg, dm, htn. patient says he is too weak to walk.

## 2025-06-09 LAB
A1C WITH ESTIMATED AVERAGE GLUCOSE RESULT: 5.5 % — SIGNIFICANT CHANGE UP (ref 4–5.6)
ANION GAP SERPL CALC-SCNC: 6 MMOL/L — SIGNIFICANT CHANGE UP (ref 5–17)
BUN SERPL-MCNC: 29 MG/DL — HIGH (ref 7–23)
CALCIUM SERPL-MCNC: 9.1 MG/DL — SIGNIFICANT CHANGE UP (ref 8.5–10.1)
CHLORIDE SERPL-SCNC: 109 MMOL/L — HIGH (ref 96–108)
CO2 SERPL-SCNC: 25 MMOL/L — SIGNIFICANT CHANGE UP (ref 22–31)
CREAT SERPL-MCNC: 1.31 MG/DL — HIGH (ref 0.5–1.3)
E COLI DNA BLD POS QL NAA+NON-PROBE: SIGNIFICANT CHANGE UP
EGFR: 53 ML/MIN/1.73M2 — LOW
EGFR: 53 ML/MIN/1.73M2 — LOW
ESTIMATED AVERAGE GLUCOSE: 111 MG/DL — SIGNIFICANT CHANGE UP (ref 68–114)
GLUCOSE BLDC GLUCOMTR-MCNC: 122 MG/DL — HIGH (ref 70–99)
GLUCOSE BLDC GLUCOMTR-MCNC: 138 MG/DL — HIGH (ref 70–99)
GLUCOSE BLDC GLUCOMTR-MCNC: 155 MG/DL — HIGH (ref 70–99)
GLUCOSE BLDC GLUCOMTR-MCNC: 157 MG/DL — HIGH (ref 70–99)
GLUCOSE SERPL-MCNC: 166 MG/DL — HIGH (ref 70–99)
GRAM STN FLD: ABNORMAL
GRAM STN FLD: ABNORMAL
HCT VFR BLD CALC: 35.3 % — LOW (ref 39–50)
HGB BLD-MCNC: 11.5 G/DL — LOW (ref 13–17)
LEGIONELLA AG UR QL: NEGATIVE — SIGNIFICANT CHANGE UP
LEGIONELLA AG UR QL: NEGATIVE — SIGNIFICANT CHANGE UP
MCHC RBC-ENTMCNC: 29.3 PG — SIGNIFICANT CHANGE UP (ref 27–34)
MCHC RBC-ENTMCNC: 32.6 G/DL — SIGNIFICANT CHANGE UP (ref 32–36)
MCV RBC AUTO: 90.1 FL — SIGNIFICANT CHANGE UP (ref 80–100)
METHOD TYPE: SIGNIFICANT CHANGE UP
NRBC # BLD AUTO: 0 K/UL — SIGNIFICANT CHANGE UP (ref 0–0)
NRBC # FLD: 0 K/UL — SIGNIFICANT CHANGE UP (ref 0–0)
NRBC BLD AUTO-RTO: 0 /100 WBCS — SIGNIFICANT CHANGE UP (ref 0–0)
PLATELET # BLD AUTO: 108 K/UL — LOW (ref 150–400)
PMV BLD: 11.4 FL — SIGNIFICANT CHANGE UP (ref 7–13)
POTASSIUM SERPL-MCNC: 3.6 MMOL/L — SIGNIFICANT CHANGE UP (ref 3.5–5.3)
POTASSIUM SERPL-SCNC: 3.6 MMOL/L — SIGNIFICANT CHANGE UP (ref 3.5–5.3)
RBC # BLD: 3.92 M/UL — LOW (ref 4.2–5.8)
RBC # FLD: 14.5 % — SIGNIFICANT CHANGE UP (ref 10.3–14.5)
S PNEUM AG UR QL: NEGATIVE — SIGNIFICANT CHANGE UP
S PNEUM AG UR QL: NEGATIVE — SIGNIFICANT CHANGE UP
SODIUM SERPL-SCNC: 140 MMOL/L — SIGNIFICANT CHANGE UP (ref 135–145)
SPECIMEN SOURCE: SIGNIFICANT CHANGE UP
SPECIMEN SOURCE: SIGNIFICANT CHANGE UP
TROPONIN I, HIGH SENSITIVITY RESULT: 72.38 NG/L — SIGNIFICANT CHANGE UP
WBC # BLD: 5.95 K/UL — SIGNIFICANT CHANGE UP (ref 3.8–10.5)
WBC # FLD AUTO: 5.95 K/UL — SIGNIFICANT CHANGE UP (ref 3.8–10.5)

## 2025-06-09 PROCEDURE — 99233 SBSQ HOSP IP/OBS HIGH 50: CPT

## 2025-06-09 RX ORDER — BRINZOLAMIDE/BRIMONIDINE TARTRATE 10; 2 MG/ML; MG/ML
1 SUSPENSION/ DROPS OPHTHALMIC
Refills: 0 | DISCHARGE

## 2025-06-09 RX ORDER — CLOPIDOGREL BISULFATE 75 MG/1
1 TABLET, FILM COATED ORAL
Refills: 0 | DISCHARGE

## 2025-06-09 RX ORDER — POTASSIUM CITRATE 5 MEQ/1
1 TABLET, EXTENDED RELEASE ORAL
Refills: 0 | DISCHARGE

## 2025-06-09 RX ORDER — VENLAFAXINE HYDROCHLORIDE 37.5 MG/1
1 CAPSULE, EXTENDED RELEASE ORAL
Refills: 0 | DISCHARGE

## 2025-06-09 RX ORDER — ASPIRIN 325 MG
1 TABLET ORAL
Refills: 0 | DISCHARGE

## 2025-06-09 RX ORDER — POLYETHYLENE GLYCOL 3350 17 G/17G
17 POWDER, FOR SOLUTION ORAL
Refills: 0 | DISCHARGE

## 2025-06-09 RX ORDER — MEMANTINE HYDROCHLORIDE 21 MG/1
1 CAPSULE, EXTENDED RELEASE ORAL
Refills: 0 | DISCHARGE

## 2025-06-09 RX ORDER — CEFTRIAXONE 500 MG/1
2000 INJECTION, POWDER, FOR SOLUTION INTRAMUSCULAR; INTRAVENOUS EVERY 24 HOURS
Refills: 0 | Status: DISCONTINUED | OUTPATIENT
Start: 2025-06-09 | End: 2025-06-13

## 2025-06-09 RX ORDER — SENNA 187 MG
1 TABLET ORAL
Refills: 0 | DISCHARGE

## 2025-06-09 RX ADMIN — DORZOLAMIDE 1 DROP(S): 20 SOLUTION/ DROPS OPHTHALMIC at 06:19

## 2025-06-09 RX ADMIN — AMLODIPINE BESYLATE 5 MILLIGRAM(S): 10 TABLET ORAL at 09:30

## 2025-06-09 RX ADMIN — MEMANTINE HYDROCHLORIDE 10 MILLIGRAM(S): 21 CAPSULE, EXTENDED RELEASE ORAL at 09:30

## 2025-06-09 RX ADMIN — FINASTERIDE 5 MILLIGRAM(S): 1 TABLET, FILM COATED ORAL at 09:30

## 2025-06-09 RX ADMIN — CEFTRIAXONE 2000 MILLIGRAM(S): 500 INJECTION, POWDER, FOR SOLUTION INTRAMUSCULAR; INTRAVENOUS at 09:30

## 2025-06-09 RX ADMIN — DORZOLAMIDE 1 DROP(S): 20 SOLUTION/ DROPS OPHTHALMIC at 22:10

## 2025-06-09 RX ADMIN — VENLAFAXINE HYDROCHLORIDE 75 MILLIGRAM(S): 37.5 CAPSULE, EXTENDED RELEASE ORAL at 09:30

## 2025-06-09 RX ADMIN — ATORVASTATIN CALCIUM 10 MILLIGRAM(S): 80 TABLET, FILM COATED ORAL at 21:56

## 2025-06-09 RX ADMIN — INSULIN LISPRO 1: 100 INJECTION, SOLUTION INTRAVENOUS; SUBCUTANEOUS at 12:49

## 2025-06-09 RX ADMIN — HEPARIN SODIUM 5000 UNIT(S): 1000 INJECTION INTRAVENOUS; SUBCUTANEOUS at 14:20

## 2025-06-09 RX ADMIN — INSULIN LISPRO 1: 100 INJECTION, SOLUTION INTRAVENOUS; SUBCUTANEOUS at 07:57

## 2025-06-09 RX ADMIN — BRIMONIDINE TARTRATE 1 DROP(S): 1.5 SOLUTION/ DROPS OPHTHALMIC at 09:31

## 2025-06-09 RX ADMIN — BRIMONIDINE TARTRATE 1 DROP(S): 1.5 SOLUTION/ DROPS OPHTHALMIC at 22:10

## 2025-06-09 RX ADMIN — Medication 10 MILLIGRAM(S): at 09:30

## 2025-06-09 RX ADMIN — DONEPEZIL HYDROCHLORIDE 10 MILLIGRAM(S): 5 TABLET ORAL at 22:01

## 2025-06-09 RX ADMIN — CELECOXIB 200 MILLIGRAM(S): 50 CAPSULE ORAL at 21:56

## 2025-06-09 RX ADMIN — MEMANTINE HYDROCHLORIDE 10 MILLIGRAM(S): 21 CAPSULE, EXTENDED RELEASE ORAL at 21:56

## 2025-06-09 RX ADMIN — Medication 40 MILLIGRAM(S): at 06:27

## 2025-06-09 RX ADMIN — HEPARIN SODIUM 5000 UNIT(S): 1000 INJECTION INTRAVENOUS; SUBCUTANEOUS at 22:10

## 2025-06-09 RX ADMIN — Medication 110 MILLIGRAM(S): at 06:23

## 2025-06-09 RX ADMIN — Medication 110 MILLIGRAM(S): at 22:10

## 2025-06-09 RX ADMIN — CEFTRIAXONE 1000 MILLIGRAM(S): 500 INJECTION, POWDER, FOR SOLUTION INTRAMUSCULAR; INTRAVENOUS at 09:24

## 2025-06-09 RX ADMIN — Medication 81 MILLIGRAM(S): at 09:31

## 2025-06-09 RX ADMIN — Medication 50 MILLILITER(S): at 11:04

## 2025-06-09 RX ADMIN — CELECOXIB 200 MILLIGRAM(S): 50 CAPSULE ORAL at 09:30

## 2025-06-09 RX ADMIN — POLYETHYLENE GLYCOL 3350 17 GRAM(S): 17 POWDER, FOR SOLUTION ORAL at 22:15

## 2025-06-09 RX ADMIN — Medication 2 TABLET(S): at 21:56

## 2025-06-09 RX ADMIN — DESMOPRESSIN ACETATE 0.1 MILLIGRAM(S): 4 INJECTION INTRAVENOUS at 21:56

## 2025-06-09 RX ADMIN — HEPARIN SODIUM 5000 UNIT(S): 1000 INJECTION INTRAVENOUS; SUBCUTANEOUS at 06:20

## 2025-06-09 RX ADMIN — TAMSULOSIN HYDROCHLORIDE 0.4 MILLIGRAM(S): 0.4 CAPSULE ORAL at 21:56

## 2025-06-09 RX ADMIN — DORZOLAMIDE 1 DROP(S): 20 SOLUTION/ DROPS OPHTHALMIC at 14:20

## 2025-06-09 RX ADMIN — CELECOXIB 200 MILLIGRAM(S): 50 CAPSULE ORAL at 09:39

## 2025-06-09 RX ADMIN — Medication 3 MILLIGRAM(S): at 21:55

## 2025-06-09 NOTE — PHYSICAL THERAPY INITIAL EVALUATION ADULT - GENERAL OBSERVATIONS, REHAB EVAL
Pt was found lying in bed with IV+, appears confused and stiff, Pt is pleasant and willing to participate in PT on much motivation

## 2025-06-09 NOTE — DIETITIAN INITIAL EVALUATION ADULT - PERTINENT MEDS FT
MEDICATIONS  (STANDING):  amLODIPine   Tablet 5 milliGRAM(s) Oral daily  aspirin enteric coated 81 milliGRAM(s) Oral daily  atorvastatin 10 milliGRAM(s) Oral at bedtime  brimonidine 0.2% Ophthalmic Solution 1 Drop(s) Both EYES every 12 hours  cefTRIAXone Injectable. 2000 milliGRAM(s) IV Push every 24 hours  celecoxib 200 milliGRAM(s) Oral every 12 hours  cetirizine 10 milliGRAM(s) Oral daily  desmopressin 0.1 milliGRAM(s) Oral at bedtime  dextrose 5%. 1000 milliLiter(s) (100 mL/Hr) IV Continuous <Continuous>  dextrose 5%. 1000 milliLiter(s) (50 mL/Hr) IV Continuous <Continuous>  dextrose 50% Injectable 25 Gram(s) IV Push once  dextrose 50% Injectable 12.5 Gram(s) IV Push once  dextrose 50% Injectable 25 Gram(s) IV Push once  donepezil 10 milliGRAM(s) Oral at bedtime  dorzolamide 2% Ophthalmic Solution 1 Drop(s) Both EYES three times a day  doxycycline IVPB 100 milliGRAM(s) IV Intermittent every 12 hours  doxycycline IVPB      finasteride 5 milliGRAM(s) Oral daily  glucagon  Injectable 1 milliGRAM(s) IntraMuscular once  heparin   Injectable 5000 Unit(s) SubCutaneous every 8 hours  insulin lispro (ADMELOG) corrective regimen sliding scale   SubCutaneous three times a day before meals  insulin lispro (ADMELOG) corrective regimen sliding scale   SubCutaneous at bedtime  memantine 10 milliGRAM(s) Oral two times a day  pantoprazole    Tablet 40 milliGRAM(s) Oral before breakfast  polyethylene glycol 3350 17 Gram(s) Oral at bedtime  senna 2 Tablet(s) Oral at bedtime  sodium chloride 0.9%. 1000 milliLiter(s) (50 mL/Hr) IV Continuous <Continuous>  tamsulosin 0.4 milliGRAM(s) Oral at bedtime  venlafaxine XR. 75 milliGRAM(s) Oral daily    MEDICATIONS  (PRN):  acetaminophen     Tablet .. 650 milliGRAM(s) Oral every 6 hours PRN Temp greater or equal to 38C (100.4F), Mild Pain (1 - 3)  aluminum hydroxide/magnesium hydroxide/simethicone Suspension 30 milliLiter(s) Oral every 4 hours PRN Dyspepsia  dextrose Oral Gel 15 Gram(s) Oral once PRN Blood Glucose LESS THAN 70 milliGRAM(s)/deciliter  melatonin 3 milliGRAM(s) Oral at bedtime PRN Insomnia  ondansetron Injectable 4 milliGRAM(s) IV Push every 8 hours PRN Nausea and/or Vomiting

## 2025-06-09 NOTE — INPATIENT CERTIFICATION FOR MEDICARE PATIENTS - RISKS OF ADVERSE EVENTS
You were seen in the emergency department for evaluation of rash.    Your rash may be the result of your antibiotic. We have added this as a potential allergy to your allergy list. Please discontinue the Bactrim. I am starting you on Levofloxacin to complete treatment. Please use as prescribed. Please call and arrange follow up with your PCP. If you develop new or worsening symptoms, please return to the ED.     Compression stockings may also help with your rash.     While I don't believe you have an emergent illness right now, your illness may progress and sometimes in unanticipated ways. If you feel you are worsening, developing new symptoms, or if you have any other concerns, please follow up with your PCP sooner, or return to the ED for evaluation.     Concern for worsening infectious process/Concern for delay in diagnosis and treatment/Concern for renal deterioration

## 2025-06-09 NOTE — CONSULT NOTE ADULT - SUBJECTIVE AND OBJECTIVE BOX
Patient is a 85y old  Male who presents with a chief complaint of ams (2025 12:47)    HPI:  "84 y/o male with PMHx of HLD, HTN, DM, CAD s/p CABG, depression, BPH, vascular dementia ; presents to ED from [facility] c/o AMS. Per EMS, pt has been more confused than baseline since he woke up. Pt states he is here because he has been too weak to walk. Denies any falls, pain. HPI limited d/t dementia and AMS." ED course: pt found to meet sepsis criteria 2/2 to LLL pna and uti - not able to provide collateral info d/t dementia.  Elevated trops in setting of sepsis. TNI: 81 Scr: 1.4 Lactate: 2.9 CTH: negative CXr: + pna RVP: negative Rec'd CFTX  2L NS provided in ED Febrile to 102 s/p ofirmev EKst degree avb (unchanged from prior) no stt changes Vitals otherwise stable UA positive, UA positive, blood cx growing ecoli.       PAST MEDICAL HISTORY:  CAD (coronary artery disease)     Depression     Glaucoma     History of BPH     HLD (hyperlipidemia).     PAST SURGICAL HISTORY:  History of tonsillectomy     Previous back surgery.     Meds: per reconciliation sheet, noted below  MEDICATIONS  (STANDING):  amLODIPine   Tablet 5 milliGRAM(s) Oral daily  aspirin enteric coated 81 milliGRAM(s) Oral daily  atorvastatin 10 milliGRAM(s) Oral at bedtime  brimonidine 0.2% Ophthalmic Solution 1 Drop(s) Both EYES every 12 hours  cefTRIAXone Injectable. 2000 milliGRAM(s) IV Push every 24 hours  celecoxib 200 milliGRAM(s) Oral every 12 hours  cetirizine 10 milliGRAM(s) Oral daily  desmopressin 0.1 milliGRAM(s) Oral at bedtime  dextrose 5%. 1000 milliLiter(s) (100 mL/Hr) IV Continuous <Continuous>  dextrose 5%. 1000 milliLiter(s) (50 mL/Hr) IV Continuous <Continuous>  dextrose 50% Injectable 25 Gram(s) IV Push once  dextrose 50% Injectable 12.5 Gram(s) IV Push once  dextrose 50% Injectable 25 Gram(s) IV Push once  donepezil 10 milliGRAM(s) Oral at bedtime  dorzolamide 2% Ophthalmic Solution 1 Drop(s) Both EYES three times a day  doxycycline IVPB 100 milliGRAM(s) IV Intermittent every 12 hours  doxycycline IVPB      finasteride 5 milliGRAM(s) Oral daily  glucagon  Injectable 1 milliGRAM(s) IntraMuscular once  heparin   Injectable 5000 Unit(s) SubCutaneous every 8 hours  insulin lispro (ADMELOG) corrective regimen sliding scale   SubCutaneous three times a day before meals  insulin lispro (ADMELOG) corrective regimen sliding scale   SubCutaneous at bedtime  memantine 10 milliGRAM(s) Oral two times a day  pantoprazole    Tablet 40 milliGRAM(s) Oral before breakfast  polyethylene glycol 3350 17 Gram(s) Oral at bedtime  senna 2 Tablet(s) Oral at bedtime  sodium chloride 0.9%. 1000 milliLiter(s) (50 mL/Hr) IV Continuous <Continuous>  tamsulosin 0.4 milliGRAM(s) Oral at bedtime  venlafaxine XR. 75 milliGRAM(s) Oral daily    Allergies    No Known Allergies    Intolerances      Social: no smoking, no alcohol, no illegal drugs; no recent travel, no exposure to TB  FAMILY HISTORY:  Patient unable to provide medical history (Father, Mother)       no history of premature cardiovascular disease in first degree relatives    ROS: unable to obtain d/t medical condition     All other systems reviewed and are negative    Vital Signs Last 24 Hrs  T(C): 36.9 (2025 07:52), Max: 39.1 (2025 16:51)  T(F): 98.4 (2025 07:52), Max: 102.4 (2025 16:51)  HR: 85 (2025 07:52) (65 - 100)  BP: 114/59 (2025 07:52) (113/77 - 138/69)  BP(mean): 69 (2025 00:01) (69 - 104)  RR: 19 (2025 00:01) (16 - 19)  SpO2: 98% (2025 00:01) (97% - 99%)    Parameters below as of 2025 00:01  Patient On (Oxygen Delivery Method): room air      Daily Height in cm: 180.34 (2025 16:51)    Daily     PE:  Constitutional: NAD  HEENT: NC/AT, EOMI, PERRLA, conjunctivae clear; ears and nose atraumatic; pharynx benign  Neck: supple; thyroid not palpable  Back: no tenderness  Respiratory: decreased breath sounds rhonchi  Cardiovascular: S1S2 regular, no murmurs  Abdomen: soft, not tender, not distended, positive BS; liver and spleen WNL  Genitourinary: no suprapubic tenderness  Lymphatic: no LN palpable  Musculoskeletal: no muscle tenderness, no joint swelling or tenderness  Extremities: no pedal edema  Neurological/ Psychiatric: moving all extremities  Skin: no rashes; no palpable lesions    Labs: all available labs reviewed                        11.5   5.95  )-----------( 108      ( 2025 08:28 )             35.3         140  |  109[H]  |  29[H]  ----------------------------<  166[H]  3.6   |  25  |  1.31[H]    Ca    9.1      2025 08:28    TPro  6.4  /  Alb  3.1[L]  /  TBili  1.3[H]  /  DBili  x   /  AST  15  /  ALT  24  /  AlkPhos  61  06-08     LIVER FUNCTIONS - ( 2025 11:00 )  Alb: 3.1 g/dL / Pro: 6.4 gm/dL / ALK PHOS: 61 U/L / ALT: 24 U/L / AST: 15 U/L / GGT: x           Urinalysis Basic - ( 2025 08:28 )    Color: x / Appearance: x / SG: x / pH: x  Gluc: 166 mg/dL / Ketone: x  / Bili: x / Urobili: x   Blood: x / Protein: x / Nitrite: x   Leuk Esterase: x / RBC: x / WBC x   Sq Epi: x / Non Sq Epi: x / Bacteria: x    Culture - Blood (25 @ 11:00)   - Escherichia coli: Detec  Gram Stain:   Growth in aerobic bottle: Gram Negative Rods  Specimen Source: Blood None  Organism: Blood Culture PCR  Culture Results:   Growth in aerobic bottle: Gram Negative Rods   Direct identification is available within approximately 3-5   hours either by Blood Panel Multiplexed PCR or Direct   MALDI-TOF. Details: [email or web address]  Organism Identification: Blood Culture PCR  Method Type: PCRCulture - Blood (25 @ 11:00)   Gram Stain:   Growth in anaerobic bottle: Gram Negative Rods  Specimen Source: Blood None  Culture Results:   Growth in anaerobic bottle: Gram Negative Rods    Radiology: all available radiological tests reviewed    ACC: [id number] EXAM:  XR CHEST PORTABLE URGENT 1V   ORDERED BY: GABE ARAMBULA     PROCEDURE DATE:  2025          INTERPRETATION:  Chest portable    CLINICAL HISTORY: Sepsis    Comparison:  2024    Normal heart and mediastinum. Lungs reveal interval development of patchy   left lower lobe infiltrates. Angles sharp. Bones without acute   abnormality.    IMPRESSION: Patchy left lower lobe pneumonia.    --- End of Report ---          < end of copied text >  < from: CT Head No Cont (25 @ 11:14) >  ACC: [id number] EXAM:  CT BRAIN   ORDERED BY: GABE ARAMBULA     PROCEDURE DATE:  2025          INTERPRETATION:  CLINICAL STATEMENT: Pain.    TECHNIQUE: CT of the head without IV contrast.    COMPARISON: CT head 2024    FINDINGS:  There is moderate diffuse parenchymal volume loss.    There are areas of low attenuation in the periventricular white matter   likely related to mild chronic microvascular ischemic changes.    There is no acute intracranial hemorrhage, parenchymal mass, mass effect   or midline shift. There is no acute territorial infarct. There is no   hydrocephalus.    The cranium is intact. The visualized paranasal sinuses are well-aerated.        IMPRESSION:  No acute intracranial hemorrhage or acute territorial infarct.  If   symptoms persist, follow-up MRI exam recommended.    --- End of Report ---    < end of copied text >    Advanced directives addressed: full resuscitation

## 2025-06-09 NOTE — CONSULT NOTE ADULT - ASSESSMENT
"84 y/o male with PMHx of HLD, HTN, DM, CAD s/p CABG, depression, BPH, vascular dementia ; presents to ED from [facility] c/o AMS. Per EMS, pt has been more confused than baseline since he woke up. Pt states he is here because he has been too weak to walk. Denies any falls, pain. HPI limited d/t dementia and AMS." ED course: pt found to meet sepsis criteria 2/2 to LLL pna and uti - not able to provide collateral info d/t dementia.  Elevated trops in setting of sepsis. TNI: 81 Scr: 1.4 Lactate: 2.9 CTH: negative CXr: + pna RVP: negative Rec'd CFTX  2L NS provided in ED Febrile to 102 s/p ofirmev EKst degree avb (unchanged from prior) no stt changes Vitals otherwise stable UA positive, UA positive, blood cx growing ecoli.     Sepsis/bacteremia with Ecoli. Pyuria. Complicated UTI. LLL pneumonia. Metabolic encephalopathy  - imaging reviewed  - agree with IV rocephin 2gm daily  - on doxycycline 100mg bid  - continue with antibiotic coverage  - f/u urine cx blood cx growing ecoli - f/u sensitivities  - monitor temps  - tolerating abx well so far; no side effects noted  - supportive care  - fu cbc    Concern for infection with multi-resistant organisms was raised. Prior cultures reviewed. An epidemiologic assessment was performed. There is a significant risk for resistant microorganisms to spread to family members, and/or healthcare staff. The patient will be placed on isolation according to infection control policy. Will reconsider isolation measures based on new culture results and other clinical data as appropriate. Appropriate cultures collected and an appropriate broad spectrum antibiotic therapy will be considered.      Clinical team may change from intravenous to oral antibiotics when the following criteria are met:   1. Patient is clinically improving/stable       a)	Improved signs and symptoms of infection from initial presentation       b)	Afebrile for 24 hours       c)	Leukocytosis trending towards normal range   2. Patient is tolerating oral intake   3. Initial/repeat blood cultures are negative    Cannot advise changing to oral antibiotic therapy until culture sensitivity is available.

## 2025-06-09 NOTE — PHYSICAL THERAPY INITIAL EVALUATION ADULT - NSPTDMEREC_GEN_A_CORE
Pt will require a rolling walker at home due to their dx of difficulty walking to help complete their MRADL's./rolling walker/toileting

## 2025-06-09 NOTE — DIETITIAN INITIAL EVALUATION ADULT - ADD RECOMMEND
1) Liberalize diet to regular to maximize caloric and nutrient intake. Encourage protein-rich foods, maximize food preferences   2) Will add Ensure Max daily to optimize nutritional needs (provides 150 kcal, 30 g protein/ shake)   3) Monitor bowel movements, if no BM for >3 days, consider implementing stronger bowel regimen.  4) MVI w/ minerals daily to ensure 100% RDA met   5) Obtain weekly wt to track/trend changes  6) Consider to obtain vitamin D 25OH level to assess nutriture  7) Monitor and optimize BG levels </=180 mg/dL by medical management   8) Confirm goals of care regarding nutrition support  Nutrition recommendations to continue upon discharge. Goal to continue to meet >/= 80% ENN via tolerated route. RD to F/U prn for changes to nutrition dc plan. RD will continue to monitor PO intake, labs, hydration, and wt prn.

## 2025-06-09 NOTE — PHARMACOTHERAPY INTERVENTION NOTE - COMMENTS
Medication history complete, patient is from [facility], reviewed and confirmed medication with list provided by facility.

## 2025-06-09 NOTE — PROVIDER CONTACT NOTE (CRITICAL VALUE NOTIFICATION) - TEST AND RESULT REPORTED:
Lactate 2.9 Troponin 81.41
2nd blood culture collected 6/8/25 at 1100 anaerobic bottle gram negative rods
BLOOD CULTURE- GRAM NEGATIVE CARLOS IN AEROBIC BOTTLE

## 2025-06-09 NOTE — DIETITIAN INITIAL EVALUATION ADULT - ORAL INTAKE PTA/DIET HISTORY
Resides at [facility], per shadow chart on regular diet. No further diet hx obtained 2/2 AMS, noted w/ h/o dementia.

## 2025-06-09 NOTE — DIETITIAN INITIAL EVALUATION ADULT - OTHER INFO
86 y/o male with PMHx of HLD, HTN, DM, CAD s/p CABG, depression, BPH, vascular dementia; presents to ED from [facility] c/o AMS. Per EMS, pt has been more confused than baseline since he woke up. Pt states he is here because he has been too weak to walk. pt found to meet sepsis criteria 2/2 to LLL pna and uti which pt is being admitted for - not able to provide collateral info d/t dementia. CTH negative for findings.     Known to RD service, has met criteria for PCM on prev admit (Sept 2024). Unable to obtain meaningful info at bed side this AM 2/2 dementia, AMS. Currently on consistent CHO w/ evening snack. Labs reviewed: POCT x 24 hrs, 1 elevated level obtained x 24 hrs - would rec to Liberalize diet to regular to maximize caloric and nutrient intake, pt also not on restricted diet at [facility]. Wt hx reviewed: 186# on 9/13/24 (taken by RD). Bed scale wt of 178# taken by RD on 6/9/25. Wt loss of 8#/ 4.30% x 9 mo - not clinically significant. NFPE reveals minimal muscle/fat wasting at this time. Will closely monitor as pt at high risk for becoming malnourished. Will add Ensure Max daily to optimize nutritional needs (provides 150 kcal, 30 g protein/ shake). See other recs below.

## 2025-06-09 NOTE — DIETITIAN INITIAL EVALUATION ADULT - PERTINENT LABORATORY DATA
06-09    140  |  109[H]  |  29[H]  ----------------------------<  166[H]  3.6   |  25  |  1.31[H]    Ca    9.1      09 Jun 2025 08:28    TPro  6.4  /  Alb  3.1[L]  /  TBili  1.3[H]  /  DBili  x   /  AST  15  /  ALT  24  /  AlkPhos  61  06-08    POCT Blood Glucose.: 155 mg/dL (09 Jun 2025 11:50)  POCT Blood Glucose.: 157 mg/dL (09 Jun 2025 07:50)  POCT Blood Glucose.: 191 mg/dL (08 Jun 2025 21:24)  POCT Blood Glucose.: 145 mg/dL (08 Jun 2025 16:45)    A1C with Estimated Average Glucose Result: 5.2 % (11-05-24 @ 13:50)

## 2025-06-09 NOTE — DIETITIAN INITIAL EVALUATION ADULT - IDEAL BODY WEIGHT (LBS)
Quality 226: Preventive Care And Screening: Tobacco Use: Screening And Cessation Intervention: Patient screened for tobacco use and is an ex/non-smoker Detail Level: Detailed Quality 128: Preventive Care And Screening: Body Mass Index (Bmi) Screening And Follow-Up Plan: BMI not documented, documentation the patient is not eligible for BMI calculation. Quality 130: Documentation Of Current Medications In The Medical Record: Current Medications Documented Quality 337: Tuberculosis Prevention For Psoriasis And Psoriatic Arthritis Patients On A Biological Immune Response Modifier: Patient has a documented negative TB screening prior to treatment. 172

## 2025-06-09 NOTE — PHYSICAL THERAPY INITIAL EVALUATION ADULT - PERTINENT HX OF CURRENT PROBLEM, REHAB EVAL
86 y/o male with PMHx of HLD, HTN, DM, CAD s/p CABG, depression, BPH, vascular dementia ; presents to ED from [facility] c/o AMS. Per EMS, pt has been more confused than baseline since he woke up. Pt states he is here because he has been too weak to walk. Denies any falls, pain. HPI limited d/t dementia and AMS."

## 2025-06-09 NOTE — DIETITIAN INITIAL EVALUATION ADULT - NSFNSGIIOFT_GEN_A_CORE
I&O's Detail    09 Jun 2025 07:01  -  09 Jun 2025 12:33  --------------------------------------------------------  IN:    sodium chloride 0.9%: 950 mL  Total IN: 950 mL    OUT:  Total OUT: 0 mL    Total NET: 950 mL

## 2025-06-10 LAB
ANION GAP SERPL CALC-SCNC: 6 MMOL/L — SIGNIFICANT CHANGE UP (ref 5–17)
BUN SERPL-MCNC: 26 MG/DL — HIGH (ref 7–23)
CALCIUM SERPL-MCNC: 8.6 MG/DL — SIGNIFICANT CHANGE UP (ref 8.5–10.1)
CHLORIDE SERPL-SCNC: 112 MMOL/L — HIGH (ref 96–108)
CO2 SERPL-SCNC: 24 MMOL/L — SIGNIFICANT CHANGE UP (ref 22–31)
CREAT SERPL-MCNC: 1.09 MG/DL — SIGNIFICANT CHANGE UP (ref 0.5–1.3)
EGFR: 67 ML/MIN/1.73M2 — SIGNIFICANT CHANGE UP
EGFR: 67 ML/MIN/1.73M2 — SIGNIFICANT CHANGE UP
GLUCOSE BLDC GLUCOMTR-MCNC: 111 MG/DL — HIGH (ref 70–99)
GLUCOSE BLDC GLUCOMTR-MCNC: 111 MG/DL — HIGH (ref 70–99)
GLUCOSE BLDC GLUCOMTR-MCNC: 123 MG/DL — HIGH (ref 70–99)
GLUCOSE BLDC GLUCOMTR-MCNC: 141 MG/DL — HIGH (ref 70–99)
GLUCOSE SERPL-MCNC: 117 MG/DL — HIGH (ref 70–99)
HCT VFR BLD CALC: 34 % — LOW (ref 39–50)
HGB BLD-MCNC: 11.1 G/DL — LOW (ref 13–17)
M PNEUMO IGG SER IA-ACNC: 0.81 INDEX — SIGNIFICANT CHANGE UP (ref 0–0.9)
M PNEUMO IGG SER IA-ACNC: NEGATIVE — SIGNIFICANT CHANGE UP
M PNEUMO IGM SER-ACNC: 0.61 INDEX — SIGNIFICANT CHANGE UP (ref 0–0.9)
MCHC RBC-ENTMCNC: 28.9 PG — SIGNIFICANT CHANGE UP (ref 27–34)
MCHC RBC-ENTMCNC: 32.6 G/DL — SIGNIFICANT CHANGE UP (ref 32–36)
MCV RBC AUTO: 88.5 FL — SIGNIFICANT CHANGE UP (ref 80–100)
MYCOPLASMA AG SPEC QL: NEGATIVE — SIGNIFICANT CHANGE UP
NRBC # BLD AUTO: 0 K/UL — SIGNIFICANT CHANGE UP (ref 0–0)
NRBC # FLD: 0 K/UL — SIGNIFICANT CHANGE UP (ref 0–0)
NRBC BLD AUTO-RTO: 0 /100 WBCS — SIGNIFICANT CHANGE UP (ref 0–0)
PLATELET # BLD AUTO: 117 K/UL — LOW (ref 150–400)
PMV BLD: 10.7 FL — SIGNIFICANT CHANGE UP (ref 7–13)
POTASSIUM SERPL-MCNC: 3.5 MMOL/L — SIGNIFICANT CHANGE UP (ref 3.5–5.3)
POTASSIUM SERPL-SCNC: 3.5 MMOL/L — SIGNIFICANT CHANGE UP (ref 3.5–5.3)
RBC # BLD: 3.84 M/UL — LOW (ref 4.2–5.8)
RBC # FLD: 14.4 % — SIGNIFICANT CHANGE UP (ref 10.3–14.5)
SODIUM SERPL-SCNC: 142 MMOL/L — SIGNIFICANT CHANGE UP (ref 135–145)
WBC # BLD: 5.04 K/UL — SIGNIFICANT CHANGE UP (ref 3.8–10.5)
WBC # FLD AUTO: 5.04 K/UL — SIGNIFICANT CHANGE UP (ref 3.8–10.5)

## 2025-06-10 PROCEDURE — 99233 SBSQ HOSP IP/OBS HIGH 50: CPT

## 2025-06-10 RX ADMIN — HEPARIN SODIUM 5000 UNIT(S): 1000 INJECTION INTRAVENOUS; SUBCUTANEOUS at 21:29

## 2025-06-10 RX ADMIN — VENLAFAXINE HYDROCHLORIDE 75 MILLIGRAM(S): 37.5 CAPSULE, EXTENDED RELEASE ORAL at 09:57

## 2025-06-10 RX ADMIN — CEFTRIAXONE 2000 MILLIGRAM(S): 500 INJECTION, POWDER, FOR SOLUTION INTRAMUSCULAR; INTRAVENOUS at 09:31

## 2025-06-10 RX ADMIN — HEPARIN SODIUM 5000 UNIT(S): 1000 INJECTION INTRAVENOUS; SUBCUTANEOUS at 13:55

## 2025-06-10 RX ADMIN — FINASTERIDE 5 MILLIGRAM(S): 1 TABLET, FILM COATED ORAL at 09:30

## 2025-06-10 RX ADMIN — MEMANTINE HYDROCHLORIDE 10 MILLIGRAM(S): 21 CAPSULE, EXTENDED RELEASE ORAL at 21:29

## 2025-06-10 RX ADMIN — MEMANTINE HYDROCHLORIDE 10 MILLIGRAM(S): 21 CAPSULE, EXTENDED RELEASE ORAL at 09:30

## 2025-06-10 RX ADMIN — Medication 10 MILLIGRAM(S): at 09:31

## 2025-06-10 RX ADMIN — DORZOLAMIDE 1 DROP(S): 20 SOLUTION/ DROPS OPHTHALMIC at 21:30

## 2025-06-10 RX ADMIN — POLYETHYLENE GLYCOL 3350 17 GRAM(S): 17 POWDER, FOR SOLUTION ORAL at 21:30

## 2025-06-10 RX ADMIN — Medication 110 MILLIGRAM(S): at 09:33

## 2025-06-10 RX ADMIN — ATORVASTATIN CALCIUM 10 MILLIGRAM(S): 80 TABLET, FILM COATED ORAL at 21:29

## 2025-06-10 RX ADMIN — Medication 110 MILLIGRAM(S): at 21:27

## 2025-06-10 RX ADMIN — Medication 2 TABLET(S): at 21:29

## 2025-06-10 RX ADMIN — CELECOXIB 200 MILLIGRAM(S): 50 CAPSULE ORAL at 09:34

## 2025-06-10 RX ADMIN — Medication 81 MILLIGRAM(S): at 09:31

## 2025-06-10 RX ADMIN — CELECOXIB 200 MILLIGRAM(S): 50 CAPSULE ORAL at 21:29

## 2025-06-10 RX ADMIN — DORZOLAMIDE 1 DROP(S): 20 SOLUTION/ DROPS OPHTHALMIC at 05:30

## 2025-06-10 RX ADMIN — AMLODIPINE BESYLATE 5 MILLIGRAM(S): 10 TABLET ORAL at 09:30

## 2025-06-10 RX ADMIN — CELECOXIB 200 MILLIGRAM(S): 50 CAPSULE ORAL at 22:00

## 2025-06-10 RX ADMIN — TAMSULOSIN HYDROCHLORIDE 0.4 MILLIGRAM(S): 0.4 CAPSULE ORAL at 21:29

## 2025-06-10 RX ADMIN — DONEPEZIL HYDROCHLORIDE 10 MILLIGRAM(S): 5 TABLET ORAL at 21:29

## 2025-06-10 RX ADMIN — Medication 50 MILLILITER(S): at 05:30

## 2025-06-10 RX ADMIN — BRIMONIDINE TARTRATE 1 DROP(S): 1.5 SOLUTION/ DROPS OPHTHALMIC at 21:30

## 2025-06-10 RX ADMIN — DORZOLAMIDE 1 DROP(S): 20 SOLUTION/ DROPS OPHTHALMIC at 13:55

## 2025-06-10 RX ADMIN — HEPARIN SODIUM 5000 UNIT(S): 1000 INJECTION INTRAVENOUS; SUBCUTANEOUS at 05:30

## 2025-06-10 RX ADMIN — DESMOPRESSIN ACETATE 0.1 MILLIGRAM(S): 4 INJECTION INTRAVENOUS at 21:29

## 2025-06-10 RX ADMIN — CELECOXIB 200 MILLIGRAM(S): 50 CAPSULE ORAL at 09:30

## 2025-06-10 RX ADMIN — BRIMONIDINE TARTRATE 1 DROP(S): 1.5 SOLUTION/ DROPS OPHTHALMIC at 09:31

## 2025-06-11 LAB
-  AMPICILLIN/SULBACTAM: SIGNIFICANT CHANGE UP
-  AMPICILLIN: SIGNIFICANT CHANGE UP
-  AZTREONAM: SIGNIFICANT CHANGE UP
-  CEFAZOLIN: SIGNIFICANT CHANGE UP
-  CEFEPIME: SIGNIFICANT CHANGE UP
-  CEFOXITIN: SIGNIFICANT CHANGE UP
-  CEFTRIAXONE: SIGNIFICANT CHANGE UP
-  CIPROFLOXACIN: SIGNIFICANT CHANGE UP
-  ERTAPENEM: SIGNIFICANT CHANGE UP
-  GENTAMICIN: SIGNIFICANT CHANGE UP
-  IMIPENEM: SIGNIFICANT CHANGE UP
-  LEVOFLOXACIN: SIGNIFICANT CHANGE UP
-  MEROPENEM: SIGNIFICANT CHANGE UP
-  PIPERACILLIN/TAZOBACTAM: SIGNIFICANT CHANGE UP
-  TIGECYCLINE: SIGNIFICANT CHANGE UP
-  TOBRAMYCIN: SIGNIFICANT CHANGE UP
-  TRIMETHOPRIM/SULFAMETHOXAZOLE: SIGNIFICANT CHANGE UP
CULTURE RESULTS: ABNORMAL
CULTURE RESULTS: ABNORMAL
GLUCOSE BLDC GLUCOMTR-MCNC: 109 MG/DL — HIGH (ref 70–99)
GLUCOSE BLDC GLUCOMTR-MCNC: 123 MG/DL — HIGH (ref 70–99)
GLUCOSE BLDC GLUCOMTR-MCNC: 128 MG/DL — HIGH (ref 70–99)
GLUCOSE BLDC GLUCOMTR-MCNC: 139 MG/DL — HIGH (ref 70–99)
METHOD TYPE: SIGNIFICANT CHANGE UP
ORGANISM # SPEC MICROSCOPIC CNT: ABNORMAL
ORGANISM # SPEC MICROSCOPIC CNT: ABNORMAL
ORGANISM # SPEC MICROSCOPIC CNT: SIGNIFICANT CHANGE UP
SPECIMEN SOURCE: SIGNIFICANT CHANGE UP
SPECIMEN SOURCE: SIGNIFICANT CHANGE UP

## 2025-06-11 PROCEDURE — 99233 SBSQ HOSP IP/OBS HIGH 50: CPT

## 2025-06-11 RX ADMIN — ATORVASTATIN CALCIUM 10 MILLIGRAM(S): 80 TABLET, FILM COATED ORAL at 21:03

## 2025-06-11 RX ADMIN — Medication 110 MILLIGRAM(S): at 09:32

## 2025-06-11 RX ADMIN — Medication 10 MILLIGRAM(S): at 09:27

## 2025-06-11 RX ADMIN — CELECOXIB 200 MILLIGRAM(S): 50 CAPSULE ORAL at 21:03

## 2025-06-11 RX ADMIN — VENLAFAXINE HYDROCHLORIDE 75 MILLIGRAM(S): 37.5 CAPSULE, EXTENDED RELEASE ORAL at 09:33

## 2025-06-11 RX ADMIN — Medication 81 MILLIGRAM(S): at 09:32

## 2025-06-11 RX ADMIN — DORZOLAMIDE 1 DROP(S): 20 SOLUTION/ DROPS OPHTHALMIC at 21:05

## 2025-06-11 RX ADMIN — BRIMONIDINE TARTRATE 1 DROP(S): 1.5 SOLUTION/ DROPS OPHTHALMIC at 09:27

## 2025-06-11 RX ADMIN — HEPARIN SODIUM 5000 UNIT(S): 1000 INJECTION INTRAVENOUS; SUBCUTANEOUS at 21:03

## 2025-06-11 RX ADMIN — AMLODIPINE BESYLATE 5 MILLIGRAM(S): 10 TABLET ORAL at 09:31

## 2025-06-11 RX ADMIN — Medication 2 TABLET(S): at 21:03

## 2025-06-11 RX ADMIN — DONEPEZIL HYDROCHLORIDE 10 MILLIGRAM(S): 5 TABLET ORAL at 21:03

## 2025-06-11 RX ADMIN — CELECOXIB 200 MILLIGRAM(S): 50 CAPSULE ORAL at 09:31

## 2025-06-11 RX ADMIN — TAMSULOSIN HYDROCHLORIDE 0.4 MILLIGRAM(S): 0.4 CAPSULE ORAL at 21:03

## 2025-06-11 RX ADMIN — DORZOLAMIDE 1 DROP(S): 20 SOLUTION/ DROPS OPHTHALMIC at 06:12

## 2025-06-11 RX ADMIN — MEMANTINE HYDROCHLORIDE 10 MILLIGRAM(S): 21 CAPSULE, EXTENDED RELEASE ORAL at 09:27

## 2025-06-11 RX ADMIN — MEMANTINE HYDROCHLORIDE 10 MILLIGRAM(S): 21 CAPSULE, EXTENDED RELEASE ORAL at 21:03

## 2025-06-11 RX ADMIN — FINASTERIDE 5 MILLIGRAM(S): 1 TABLET, FILM COATED ORAL at 09:31

## 2025-06-11 RX ADMIN — Medication 3 MILLIGRAM(S): at 21:03

## 2025-06-11 RX ADMIN — HEPARIN SODIUM 5000 UNIT(S): 1000 INJECTION INTRAVENOUS; SUBCUTANEOUS at 15:23

## 2025-06-11 RX ADMIN — Medication 110 MILLIGRAM(S): at 21:05

## 2025-06-11 RX ADMIN — BRIMONIDINE TARTRATE 1 DROP(S): 1.5 SOLUTION/ DROPS OPHTHALMIC at 21:04

## 2025-06-11 RX ADMIN — HEPARIN SODIUM 5000 UNIT(S): 1000 INJECTION INTRAVENOUS; SUBCUTANEOUS at 06:11

## 2025-06-11 RX ADMIN — Medication 40 MILLIGRAM(S): at 06:11

## 2025-06-11 RX ADMIN — DESMOPRESSIN ACETATE 0.1 MILLIGRAM(S): 4 INJECTION INTRAVENOUS at 21:04

## 2025-06-11 RX ADMIN — POLYETHYLENE GLYCOL 3350 17 GRAM(S): 17 POWDER, FOR SOLUTION ORAL at 21:05

## 2025-06-11 RX ADMIN — CEFTRIAXONE 2000 MILLIGRAM(S): 500 INJECTION, POWDER, FOR SOLUTION INTRAMUSCULAR; INTRAVENOUS at 09:32

## 2025-06-11 RX ADMIN — DORZOLAMIDE 1 DROP(S): 20 SOLUTION/ DROPS OPHTHALMIC at 15:18

## 2025-06-12 ENCOUNTER — TRANSCRIPTION ENCOUNTER (OUTPATIENT)
Age: 85
End: 2025-06-12

## 2025-06-12 LAB
-  AMOXICILLIN/CLAVULANIC ACID: SIGNIFICANT CHANGE UP
-  AMPICILLIN/SULBACTAM: SIGNIFICANT CHANGE UP
-  AMPICILLIN: SIGNIFICANT CHANGE UP
-  AZTREONAM: SIGNIFICANT CHANGE UP
-  CEFAZOLIN: SIGNIFICANT CHANGE UP
-  CEFEPIME: SIGNIFICANT CHANGE UP
-  CEFOXITIN: SIGNIFICANT CHANGE UP
-  CEFTRIAXONE: SIGNIFICANT CHANGE UP
-  CEFUROXIME: SIGNIFICANT CHANGE UP
-  CIPROFLOXACIN: SIGNIFICANT CHANGE UP
-  ERTAPENEM: SIGNIFICANT CHANGE UP
-  GENTAMICIN: SIGNIFICANT CHANGE UP
-  IMIPENEM: SIGNIFICANT CHANGE UP
-  LEVOFLOXACIN: SIGNIFICANT CHANGE UP
-  MEROPENEM: SIGNIFICANT CHANGE UP
-  NITROFURANTOIN: SIGNIFICANT CHANGE UP
-  PIPERACILLIN/TAZOBACTAM: SIGNIFICANT CHANGE UP
-  TIGECYCLINE: SIGNIFICANT CHANGE UP
-  TOBRAMYCIN: SIGNIFICANT CHANGE UP
-  TRIMETHOPRIM/SULFAMETHOXAZOLE: SIGNIFICANT CHANGE UP
CULTURE RESULTS: ABNORMAL
GLUCOSE BLDC GLUCOMTR-MCNC: 114 MG/DL — HIGH (ref 70–99)
GLUCOSE BLDC GLUCOMTR-MCNC: 132 MG/DL — HIGH (ref 70–99)
METHOD TYPE: SIGNIFICANT CHANGE UP
ORGANISM # SPEC MICROSCOPIC CNT: ABNORMAL
ORGANISM # SPEC MICROSCOPIC CNT: SIGNIFICANT CHANGE UP
SPECIMEN SOURCE: SIGNIFICANT CHANGE UP

## 2025-06-12 PROCEDURE — 99239 HOSP IP/OBS DSCHRG MGMT >30: CPT

## 2025-06-12 RX ORDER — CEFUROXIME SODIUM 1.5 G
1 VIAL (EA) INJECTION
Qty: 22 | Refills: 0
Start: 2025-06-12 | End: 2025-06-22

## 2025-06-12 RX ADMIN — DORZOLAMIDE 1 DROP(S): 20 SOLUTION/ DROPS OPHTHALMIC at 20:48

## 2025-06-12 RX ADMIN — AMLODIPINE BESYLATE 5 MILLIGRAM(S): 10 TABLET ORAL at 11:07

## 2025-06-12 RX ADMIN — CEFTRIAXONE 2000 MILLIGRAM(S): 500 INJECTION, POWDER, FOR SOLUTION INTRAMUSCULAR; INTRAVENOUS at 11:05

## 2025-06-12 RX ADMIN — FINASTERIDE 5 MILLIGRAM(S): 1 TABLET, FILM COATED ORAL at 11:07

## 2025-06-12 RX ADMIN — Medication 110 MILLIGRAM(S): at 11:19

## 2025-06-12 RX ADMIN — TAMSULOSIN HYDROCHLORIDE 0.4 MILLIGRAM(S): 0.4 CAPSULE ORAL at 20:50

## 2025-06-12 RX ADMIN — DONEPEZIL HYDROCHLORIDE 10 MILLIGRAM(S): 5 TABLET ORAL at 20:50

## 2025-06-12 RX ADMIN — HEPARIN SODIUM 5000 UNIT(S): 1000 INJECTION INTRAVENOUS; SUBCUTANEOUS at 20:49

## 2025-06-12 RX ADMIN — Medication 10 MILLIGRAM(S): at 11:19

## 2025-06-12 RX ADMIN — Medication 110 MILLIGRAM(S): at 20:49

## 2025-06-12 RX ADMIN — MEMANTINE HYDROCHLORIDE 10 MILLIGRAM(S): 21 CAPSULE, EXTENDED RELEASE ORAL at 11:11

## 2025-06-12 RX ADMIN — CELECOXIB 200 MILLIGRAM(S): 50 CAPSULE ORAL at 11:07

## 2025-06-12 RX ADMIN — HEPARIN SODIUM 5000 UNIT(S): 1000 INJECTION INTRAVENOUS; SUBCUTANEOUS at 06:20

## 2025-06-12 RX ADMIN — HEPARIN SODIUM 5000 UNIT(S): 1000 INJECTION INTRAVENOUS; SUBCUTANEOUS at 14:06

## 2025-06-12 RX ADMIN — CELECOXIB 200 MILLIGRAM(S): 50 CAPSULE ORAL at 20:50

## 2025-06-12 RX ADMIN — MEMANTINE HYDROCHLORIDE 10 MILLIGRAM(S): 21 CAPSULE, EXTENDED RELEASE ORAL at 20:50

## 2025-06-12 RX ADMIN — POLYETHYLENE GLYCOL 3350 17 GRAM(S): 17 POWDER, FOR SOLUTION ORAL at 20:49

## 2025-06-12 RX ADMIN — ATORVASTATIN CALCIUM 10 MILLIGRAM(S): 80 TABLET, FILM COATED ORAL at 20:50

## 2025-06-12 RX ADMIN — Medication 81 MILLIGRAM(S): at 11:07

## 2025-06-12 RX ADMIN — DESMOPRESSIN ACETATE 0.1 MILLIGRAM(S): 4 INJECTION INTRAVENOUS at 20:49

## 2025-06-12 RX ADMIN — VENLAFAXINE HYDROCHLORIDE 75 MILLIGRAM(S): 37.5 CAPSULE, EXTENDED RELEASE ORAL at 11:07

## 2025-06-12 RX ADMIN — BRIMONIDINE TARTRATE 1 DROP(S): 1.5 SOLUTION/ DROPS OPHTHALMIC at 11:12

## 2025-06-12 RX ADMIN — DORZOLAMIDE 1 DROP(S): 20 SOLUTION/ DROPS OPHTHALMIC at 06:21

## 2025-06-12 RX ADMIN — DORZOLAMIDE 1 DROP(S): 20 SOLUTION/ DROPS OPHTHALMIC at 14:03

## 2025-06-12 RX ADMIN — BRIMONIDINE TARTRATE 1 DROP(S): 1.5 SOLUTION/ DROPS OPHTHALMIC at 20:48

## 2025-06-12 RX ADMIN — Medication 2 TABLET(S): at 20:50

## 2025-06-12 NOTE — DISCHARGE NOTE NURSING/CASE MANAGEMENT/SOCIAL WORK - NSPROEXTENSIONSOFSELF_GEN_A_NUR
Problem: Safety - Medical Restraint  Goal: Remains free of injury from restraints (Restraint for Interference with Medical Device)  Description: INTERVENTIONS:  1. Determine that other, less restrictive measures have been tried or would not be effective before applying the restraint  2. Evaluate the patient's condition at the time of restraint application  3. Inform patient/family regarding the reason for restraint  4. Q2H: Monitor safety, psychosocial status, comfort, nutrition and hydration  Outcome: Progressing   Restraints continued r/t patient pulling on lines. Avasys monitor in room to maintain safety. Problem: Discharge Planning  Goal: Discharge to home or other facility with appropriate resources  Outcome: Progressing     Problem: Safety - Adult  Goal: Free from fall injury  Outcome: Progressing   Fall precautions in place. Bed alarm activated, low position, wheels locked. Bedrest.  Avasys monitor to maintain safety, seizure precautions. Call light reviewed, patient unable to utilize. Problem: Skin/Tissue Integrity  Goal: Absence of new skin breakdown  Description: 1. Monitor for areas of redness and/or skin breakdown  2. Assess vascular access sites hourly  3. Every 4-6 hours minimum:  Change oxygen saturation probe site  4. Every 4-6 hours:  If on nasal continuous positive airway pressure, respiratory therapy assess nares and determine need for appliance change or resting period. Outcome: Progressing   Sacral heart intact. No signs of skin breakdown. Redness, swelling to L nare. Patient repositioned q 2 hours to prevent skin breakdown. Continue to monitor. Problem: Neurosensory - Adult  Goal: Achieves stable or improved neurological status  Outcome: Progressing   Patient nods appropriately to name and birthday only. R side flaccid. Problem: Respiratory - Adult  Goal: Achieves optimal ventilation and oxygenation  Outcome: Progressing  SpO2 96% 2 L NC.       Problem: none

## 2025-06-12 NOTE — PROGRESS NOTE ADULT - ASSESSMENT
"86 y/o male with PMHx of HLD, HTN, DM, CAD s/p CABG, depression, BPH, vascular dementia ; presents to ED from [facility] c/o AMS. Per EMS, pt has been more confused than baseline since he woke up. Pt states he is here because he has been too weak to walk. Denies any falls, pain. HPI limited d/t dementia and AMS." ED course: pt found to meet sepsis criteria 2/2 to LLL pna and uti - not able to provide collateral info d/t dementia.  Elevated trops in setting of sepsis. TNI: 81 Scr: 1.4 Lactate: 2.9 CTH: negative CXr: + pna RVP: negative Rec'd CFTX  2L NS provided in ED Febrile to 102 s/p ofirmev EKst degree avb (unchanged from prior) no stt changes Vitals otherwise stable UA positive, UA positive, blood cx growing ecoli.     Sepsis/bacteremia with Ecoli. Pyuria. Complicated UTI. LLL pneumonia. Metabolic encephalopathy  - imaging reviewed  - on IV rocephin 2gm daily #3   - on doxycycline 100mg bid #3  - continue with antibiotic coverage  - f/u urine cx blood cx growing ecoli - f/u sensitivities  - monitor temps  - tolerating abx well so far; no side effects noted  - supportive care  - fu cbc    Concern for infection with multi-resistant organisms was raised. Prior cultures reviewed. An epidemiologic assessment was performed. There is a significant risk for resistant microorganisms to spread to family members, and/or healthcare staff. The patient will be placed on isolation according to infection control policy. Will reconsider isolation measures based on new culture results and other clinical data as appropriate. Appropriate cultures collected and an appropriate broad spectrum antibiotic therapy will be considered.      Clinical team may change from intravenous to oral antibiotics when the following criteria are met:   1. Patient is clinically improving/stable       a)	Improved signs and symptoms of infection from initial presentation       b)	Afebrile for 24 hours       c)	Leukocytosis trending towards normal range   2. Patient is tolerating oral intake   3. Initial/repeat blood cultures are negative    Cannot advise changing to oral antibiotic therapy until culture sensitivity is available.  
#Sepsis - poa  #Ecoli bacteremia- ceftriaxone increased to 2G- ID consulted   #UTI/PNA will treat as CAP  #Suspect demand ischemia in setting of sepsis- troponin trending down   #JOSEE- resolving   #Metabolic encephalopathy / dementia- resolving   - CTH : negative  - trend trops q8h- troponin trending down   - DAPT + statin for now - per records, last cath 1/2024, (patent Lcx stent, RCA stent deployed - on dapt)  - TTE  - EKG w/o dynamic changes  - Cont abx and add doxycycline  - legionella/mycoplasma/strep ags  - follow blood/urine  cultures - E coli   - IVF   - leukocytosis - imprved   - renally dose meds   - Fall precautions/frequent re orientation  - cardio consult   - anemia- Hgb stable   - VTE prophylaxis- heparin  - PT consult       #Discussed with son in detail     
#Sepsis - poa  #Ecoli bacteremia- ceftriaxone increased to 2G- ID consulted   #UTI/PNA will treat as CAP  #Suspect demand ischemia in setting of sepsis- troponin trending down   #JOSEE- resolving   #Metabolic encephalopathy / dementia- resolving   - CTH : negative  - trend trops q8h- troponin trending down   - DAPT + statin for now - per records, last cath 1/2024, (patent Lcx stent, RCA stent deployed - on dapt)  - TTE  - EKG w/o dynamic changes  - Cont abx and add doxycycline  - legionella/mycoplasma/strep ags  - follow blood/urine  cultures - E coli   - IVF   - leukocytosis - imprved   - renally dose meds   - Fall precautions/frequent re orientation  - cardio consult   - anemia- Hgb stable   - VTE prophylaxis- heparin  - PT consult       Case d/w team on IDR.     
86 y/o male with PMHx of HLD, HTN, DM, CAD s/p CABG, depression, BPH, vascular dementia ; presents to ED from [facility] c/o AMS. Per EMS, pt has been more confused than baseline since he woke up. Pt states he is here because he has been too weak to walk. Denies any falls, pain. HPI limited d/t dementia and AMS." ED course: pt found to meet sepsis criteria 2/2 to LLL pna and uti - not able to provide collateral info d/t dementia.  Elevated trops in setting of sepsis. TNI: 81 Scr: 1.4 Lactate: 2.9 CTH: negative CXr: + pna RVP: negative Rec'd CFTX  2L NS provided in ED Febrile to 102 s/p ofirmev EKst degree avb (unchanged from prior) no stt changes Vitals otherwise stable UA positive, UA positive, blood cx growing ecoli.     Sepsis/bacteremia with Ecoli. Pyuria. Complicated UTI. LLL pneumonia. Metabolic encephalopathy  - imaging reviewed  - on IV rocephin 2gm daily #5  - on doxycycline 100mg bid #5  - continue with antibiotic coverage  - f/u urine cx blood cx growing ecoli -  sensitivities reviewed  - monitor temps  - tolerating abx well so far; no side effects noted  - supportive care  - fu cbc    Concern for infection with multi-resistant organisms was raised. Prior cultures reviewed. An epidemiologic assessment was performed. There is a significant risk for resistant microorganisms to spread to family members, and/or healthcare staff. The patient will be placed on isolation according to infection control policy. Will reconsider isolation measures based on new culture results and other clinical data as appropriate. Appropriate cultures collected and an appropriate broad spectrum antibiotic therapy will be considered.      Clinical team may change from intravenous to oral antibiotics when the following criteria are met:   1. Patient is clinically improving/stable       a)	Improved signs and symptoms of infection from initial presentation       b)	Afebrile for 24 hours       c)	Leukocytosis trending towards normal range   2. Patient is tolerating oral intake   3. Initial/repeat blood cultures are negative    no further doxy, dc with po ceftin 500mg BID x 14 day course   
#Sepsis - poa    #Ecoli bacteremia- ceftriaxone s/p increased to 2G-     #UTI/PNA will treat as CAP    #Suspect demand ischemia in setting of sepsis- troponin trending down     #JOSEE- resolved     #Metabolic encephalopathy / dementia- resolving   - CTH : negative  - trend trops q8h- troponin trending down   - DAPT + statin for now - per records, last cath 1/2024, (patent Lcx stent, RCA stent deployed - on dapt)  - TTE  - EKG w/o dynamic changes  - Cont abx and add doxycycline  - legionella/mycoplasma/strep ags  - follow blood/urine  cultures - E coli   - IVF   - leukocytosis - imprved   - renally dose meds   - Fall precautions/frequent re orientation    #anemia- Hgb stable     #VTE prophylaxis- heparin      # Discharge disposition -medically stable, possibly d/c to rehab tomorrow on abx as per ID

## 2025-06-12 NOTE — DISCHARGE NOTE NURSING/CASE MANAGEMENT/SOCIAL WORK - FINANCIAL ASSISTANCE
[facility] provides services at a reduced cost to those who are determined to be eligible through [facility]’s financial assistance program. Information regarding [facility]’s financial assistance program can be found by going to [email or web address] or by calling [phone].

## 2025-06-12 NOTE — DISCHARGE NOTE PROVIDER - NSDCCPCAREPLAN_GEN_ALL_CORE_FT
PRINCIPAL DISCHARGE DIAGNOSIS  Diagnosis: E coli bacteremia  Assessment and Plan of Treatment: take antibiotics as prescribed for 14 days total      SECONDARY DISCHARGE DIAGNOSES  Diagnosis: Acute UTI  Assessment and Plan of Treatment: take antibiotics as prescribed    Diagnosis: Pneumonia  Assessment and Plan of Treatment: take antibiotics as prestake antibiotics as prescribedcribed

## 2025-06-12 NOTE — DISCHARGE NOTE PROVIDER - CARE PROVIDER_API CALL
Don Lynn  Family Medicine  [address] [place] [id number]  Phone: [phone]  Fax: [phone]  Follow Up Time: 1 week

## 2025-06-12 NOTE — DISCHARGE NOTE PROVIDER - HOSPITAL COURSE
CC: AMS    Subjective and Objective:   86 y/o man with PMHx of HLD, HTN, DM, CAD s/p CABG, depression, BPH, vascular dementia, presented to ED from [facility] c/o AMS. Per EMS, pt has been more confused than baseline since he woke up. pt found to meet sepsis criteria 2/2 to LLL pna and uti . Patient was started on IV Antibiotics.     Hospital course:  Pt admitted with sepsis POA due to ecoli bacteremia, UTI, and LLL PNA.  Pt treated with ceftriaxone 2g and doxy.  Pt initially with JOSEE, resolved with IVFs.  Pt can be discharged on oral ceftin to complete 14 day course.    REVIEW OF SYSTEMS: All other review of systems is negative unless indicated above.    Vital Signs Last 24 Hrs  T(C): 36.6 (12 Jun 2025 07:25), Max: 36.8 (11 Jun 2025 16:00)  T(F): 97.8 (12 Jun 2025 07:25), Max: 98.2 (11 Jun 2025 16:00)  HR: 57 (12 Jun 2025 07:25) (57 - 81)  BP: 150/71 (12 Jun 2025 07:25) (115/64 - 150/71)  BP(mean): --  RR: 17 (12 Jun 2025 07:25) (16 - 17)  SpO2: 95% (12 Jun 2025 07:25) (95% - 99%)    Parameters below as of 12 Jun 2025 07:25  Patient On (Oxygen Delivery Method): room air        PHYSICAL EXAM:    Constitutional: NAD, awake and alert, confused  HEENT: PERR, EOMI, Normal Hearing, MMM  Neck: Soft and supple  Respiratory: Breath sounds are clear bilaterally, No wheezing, rales or rhonchi  Cardiovascular: S1 and S2, regular rate and rhythm, no Murmurs, gallops or rubs  Gastrointestinal: Bowel Sounds present, soft, nontender, nondistended, no guarding, no rebound  Extremities: No peripheral edema  Neurological: A/O x 3, no focal deficits in my limited exam    med/labs: Reviewed and interpreted       Assessment and Plan:     Sepsis - poa due to Ecoli bacteremia / UTI / PNA:  - cc/w ceftriaxone and doxy  - legionella/mycoplasma/strep ags  - d/c on 14 days of ceftin per ecoli sensitivities       Demand ischemia in setting of sepsis:  - No CP  - troponin trending down       JOESE- resolved   - s/p IVFs      Metabolic encephalopathy / dementia- resolving   - CTH : negative  - DAPT + statin for now - per records, last cath 1/2024, (patent Lcx stent, RCA stent deployed - on dapt)  - TTE: EF 60 to 65 %  - EKG w/o dynamic changes      #anemia- Hgb stable         Dispo:  - to [facility]    Attending Statement: 40 minutes spent on total encounter and discharge planning.

## 2025-06-12 NOTE — DISCHARGE NOTE NURSING/CASE MANAGEMENT/SOCIAL WORK - PATIENT PORTAL LINK FT
You can access the FollowMyHealth Patient Portal offered by [facility] by registering at the following website: [email or web address]. By joining [employer]’s FollowMyHealth portal, you will also be able to view your health information using other applications (apps) compatible with our system.

## 2025-06-12 NOTE — DISCHARGE NOTE PROVIDER - NSDCMRMEDTOKEN_GEN_ALL_CORE_FT
acetaminophen 325 mg oral tablet: 2 tab(s) orally every 6 hours as needed for hip pain  amLODIPine 5 mg oral tablet: 1 tab(s) orally once a day  aspirin 81 mg oral tablet, chewable: 1 tab(s) chewed once a day  atorvastatin 10 mg oral tablet: 1 tab(s) orally once a day (at bedtime)  cefuroxime 500 mg oral tablet: 1 tab(s) orally 2 times a day for 14 day course total to end on 6/23  celecoxib 200 mg oral capsule: 1 cap(s) orally every 12 hours  cetirizine 10 mg oral tablet: 1 tab(s) orally once a day  clopidogrel 75 mg oral tablet: 1 tab(s) orally once a day  desmopressin 0.1 mg oral tablet: 1 tab(s) orally once a day (at bedtime)  donepezil 10 mg oral tablet: 1 tab(s) orally once a day (at bedtime)  finasteride 5 mg oral tablet: 1 tab(s) orally once a day  memantine 28 mg oral capsule, extended release: 1 cap(s) orally once a day  MiraLax oral powder for reconstitution: 17 gram(s) orally once a day as needed for  constipation  pantoprazole 40 mg oral delayed release tablet: 1 tab(s) orally once a day (before a meal)  potassium citrate 15 mEq oral tablet, extended release: 1 tab(s) orally once a day  senna (sennosides) 8.6 mg oral tablet: 1 tab(s) orally once a day as needed for  constipation  Simbrinza 1%- 0.2% ophthalmic suspension: 1 drop(s) in each eye 2 times a day  tamsulosin 0.4 mg oral capsule: 1 cap(s) orally once a day (at bedtime)  venlafaxine 75 mg oral tablet: 1 tab(s) orally once a day

## 2025-06-12 NOTE — DISCHARGE NOTE PROVIDER - NSDCFUADDAPPT_GEN_ALL_CORE_FT
APPTS ARE READY TO BE MADE: [ x ] YES    Best Family or Patient Contact (if needed):    Additional Information about above appointments (if needed):    1:   2:   3:     Other comments or requests:    APPTS ARE READY TO BE MADE: [ x ] YES    Best Family or Patient Contact (if needed):    Additional Information about above appointments (if needed):    1:   2:   3:     Other comments or requests:   Patient advised they did not want to proceed with scheduling appointments with the providers on their referrals. They will coordinate care on their own.

## 2025-06-13 VITALS
DIASTOLIC BLOOD PRESSURE: 62 MMHG | TEMPERATURE: 98 F | HEART RATE: 63 BPM | RESPIRATION RATE: 17 BRPM | OXYGEN SATURATION: 97 % | SYSTOLIC BLOOD PRESSURE: 150 MMHG

## 2025-06-13 PROCEDURE — 99232 SBSQ HOSP IP/OBS MODERATE 35: CPT

## 2025-06-13 RX ADMIN — CELECOXIB 200 MILLIGRAM(S): 50 CAPSULE ORAL at 09:59

## 2025-06-13 RX ADMIN — Medication 81 MILLIGRAM(S): at 09:59

## 2025-06-13 RX ADMIN — FINASTERIDE 5 MILLIGRAM(S): 1 TABLET, FILM COATED ORAL at 09:59

## 2025-06-13 RX ADMIN — CELECOXIB 200 MILLIGRAM(S): 50 CAPSULE ORAL at 10:40

## 2025-06-13 RX ADMIN — HEPARIN SODIUM 5000 UNIT(S): 1000 INJECTION INTRAVENOUS; SUBCUTANEOUS at 05:48

## 2025-06-13 RX ADMIN — Medication 40 MILLIGRAM(S): at 05:47

## 2025-06-13 RX ADMIN — CEFTRIAXONE 2000 MILLIGRAM(S): 500 INJECTION, POWDER, FOR SOLUTION INTRAMUSCULAR; INTRAVENOUS at 09:58

## 2025-06-13 RX ADMIN — Medication 110 MILLIGRAM(S): at 09:58

## 2025-06-13 RX ADMIN — DORZOLAMIDE 1 DROP(S): 20 SOLUTION/ DROPS OPHTHALMIC at 05:48

## 2025-06-13 RX ADMIN — AMLODIPINE BESYLATE 5 MILLIGRAM(S): 10 TABLET ORAL at 09:59

## 2025-06-13 RX ADMIN — MEMANTINE HYDROCHLORIDE 10 MILLIGRAM(S): 21 CAPSULE, EXTENDED RELEASE ORAL at 09:59

## 2025-06-13 RX ADMIN — VENLAFAXINE HYDROCHLORIDE 75 MILLIGRAM(S): 37.5 CAPSULE, EXTENDED RELEASE ORAL at 09:59

## 2025-06-13 RX ADMIN — BRIMONIDINE TARTRATE 1 DROP(S): 1.5 SOLUTION/ DROPS OPHTHALMIC at 10:00

## 2025-06-13 RX ADMIN — Medication 10 MILLIGRAM(S): at 09:59

## 2025-06-13 NOTE — PROGRESS NOTE ADULT - SUBJECTIVE AND OBJECTIVE BOX
CC: AMS    Subjective and Objective:   86 y/o man with PMHx of HLD, HTN, DM, CAD s/p CABG, depression, BPH, vascular dementia, presented to ED from [facility] c/o AMS. Per EMS, pt has been more confused than baseline since he woke up. pt found to meet sepsis criteria 2/2 to LLL pna and uti . Patient was started on IV Antibiotics.     Hospital course:  Pt admitted with sepsis POA due to ecoli bacteremia, UTI, and LLL PNA.  Pt treated with ceftriaxone 2g and doxy.  Pt initially with JOSEE, resolved with IVFs.  Pt can be discharged on oral ceftin to complete 14 day course.    S:  6/13: Awaiting d/c to [facility].  No o/n events, status quo, comfortable.      REVIEW OF SYSTEMS: All other review of systems is negative unless indicated above.    Vital Signs Last 24 Hrs  T(C): 36.9 (13 Jun 2025 07:34), Max: 36.9 (13 Jun 2025 07:34)  T(F): 98.4 (13 Jun 2025 07:34), Max: 98.4 (13 Jun 2025 07:34)  HR: 63 (13 Jun 2025 07:34) (62 - 65)  BP: 150/62 (13 Jun 2025 07:34) (147/77 - 150/64)  BP(mean): 86 (12 Jun 2025 23:44) (86 - 92)  RR: 17 (13 Jun 2025 07:34) (16 - 18)  SpO2: 97% (13 Jun 2025 07:34) (93% - 97%)    Parameters below as of 13 Jun 2025 07:34  Patient On (Oxygen Delivery Method): room air        PHYSICAL EXAM:    Constitutional: NAD, awake and alert, confused  HEENT: PERR, EOMI, Normal Hearing, MMM  Neck: Soft and supple  Respiratory: Breath sounds are clear bilaterally, No wheezing, rales or rhonchi  Cardiovascular: S1 and S2, regular rate and rhythm, no Murmurs, gallops or rubs  Gastrointestinal: Bowel Sounds present, soft, nontender, nondistended, no guarding, no rebound  Extremities: No peripheral edema  Neurological: A/O x 3, no focal deficits in my limited exam      MEDICATIONS  (STANDING):  amLODIPine   Tablet 5 milliGRAM(s) Oral daily  aspirin enteric coated 81 milliGRAM(s) Oral daily  atorvastatin 10 milliGRAM(s) Oral at bedtime  brimonidine 0.2% Ophthalmic Solution 1 Drop(s) Both EYES every 12 hours  cefTRIAXone Injectable. 2000 milliGRAM(s) IV Push every 24 hours  celecoxib 200 milliGRAM(s) Oral every 12 hours  cetirizine 10 milliGRAM(s) Oral daily  desmopressin 0.1 milliGRAM(s) Oral at bedtime  dextrose 5%. 1000 milliLiter(s) (100 mL/Hr) IV Continuous <Continuous>  dextrose 5%. 1000 milliLiter(s) (50 mL/Hr) IV Continuous <Continuous>  dextrose 50% Injectable 25 Gram(s) IV Push once  dextrose 50% Injectable 12.5 Gram(s) IV Push once  dextrose 50% Injectable 25 Gram(s) IV Push once  donepezil 10 milliGRAM(s) Oral at bedtime  dorzolamide 2% Ophthalmic Solution 1 Drop(s) Both EYES three times a day  doxycycline IVPB 100 milliGRAM(s) IV Intermittent every 12 hours  doxycycline IVPB      finasteride 5 milliGRAM(s) Oral daily  glucagon  Injectable 1 milliGRAM(s) IntraMuscular once  heparin   Injectable 5000 Unit(s) SubCutaneous every 8 hours  memantine 10 milliGRAM(s) Oral two times a day  pantoprazole    Tablet 40 milliGRAM(s) Oral before breakfast  polyethylene glycol 3350 17 Gram(s) Oral at bedtime  senna 2 Tablet(s) Oral at bedtime  tamsulosin 0.4 milliGRAM(s) Oral at bedtime  venlafaxine XR. 75 milliGRAM(s) Oral daily    MEDICATIONS  (PRN):  acetaminophen     Tablet .. 650 milliGRAM(s) Oral every 6 hours PRN Temp greater or equal to 38C (100.4F), Mild Pain (1 - 3)  aluminum hydroxide/magnesium hydroxide/simethicone Suspension 30 milliLiter(s) Oral every 4 hours PRN Dyspepsia  dextrose Oral Gel 15 Gram(s) Oral once PRN Blood Glucose LESS THAN 70 milliGRAM(s)/deciliter  melatonin 3 milliGRAM(s) Oral at bedtime PRN Insomnia  ondansetron Injectable 4 milliGRAM(s) IV Push every 8 hours PRN Nausea and/or Vomiting                  CAPILLARY BLOOD GLUCOSE      POCT Blood Glucose.: 132 mg/dL (12 Jun 2025 12:17)        Assessment and Plan:     Sepsis - poa due to Ecoli bacteremia / UTI / PNA:  - c/w ceftriaxone and doxy  - legionella/mycoplasma/strep ags  - d/c on 14 days of ceftin per ecoli sensitivities       Demand ischemia in setting of sepsis:  - No CP  - troponin trending down       JOSEE- resolved   - s/p IVFs      Metabolic encephalopathy / dementia- resolving   - CTH : negative  - DAPT + statin for now - per records, last cath 1/2024, (patent Lcx stent, RCA stent deployed - on dapt)  - TTE: EF 60 to 65 %  - EKG w/o dynamic changes      #anemia- Hgb stable         Dispo:  - to [facility] due to weakness.    
Date of service: 06-10-25 @ 16:48    ROS: no fever or chills; denies dizziness, no HA, no SOB or cough, no abdominal pain, no diarrhea or constipation; no dysuria, no urinary frequency, no legs pain, no rashes    MEDICATIONS  (STANDING):  amLODIPine   Tablet 5 milliGRAM(s) Oral daily  aspirin enteric coated 81 milliGRAM(s) Oral daily  atorvastatin 10 milliGRAM(s) Oral at bedtime  brimonidine 0.2% Ophthalmic Solution 1 Drop(s) Both EYES every 12 hours  cefTRIAXone Injectable. 2000 milliGRAM(s) IV Push every 24 hours  celecoxib 200 milliGRAM(s) Oral every 12 hours  cetirizine 10 milliGRAM(s) Oral daily  desmopressin 0.1 milliGRAM(s) Oral at bedtime  dextrose 5%. 1000 milliLiter(s) (100 mL/Hr) IV Continuous <Continuous>  dextrose 5%. 1000 milliLiter(s) (50 mL/Hr) IV Continuous <Continuous>  dextrose 50% Injectable 25 Gram(s) IV Push once  dextrose 50% Injectable 12.5 Gram(s) IV Push once  dextrose 50% Injectable 25 Gram(s) IV Push once  donepezil 10 milliGRAM(s) Oral at bedtime  dorzolamide 2% Ophthalmic Solution 1 Drop(s) Both EYES three times a day  doxycycline IVPB 100 milliGRAM(s) IV Intermittent every 12 hours  doxycycline IVPB      finasteride 5 milliGRAM(s) Oral daily  glucagon  Injectable 1 milliGRAM(s) IntraMuscular once  heparin   Injectable 5000 Unit(s) SubCutaneous every 8 hours  insulin lispro (ADMELOG) corrective regimen sliding scale   SubCutaneous three times a day before meals  insulin lispro (ADMELOG) corrective regimen sliding scale   SubCutaneous at bedtime  memantine 10 milliGRAM(s) Oral two times a day  pantoprazole    Tablet 40 milliGRAM(s) Oral before breakfast  polyethylene glycol 3350 17 Gram(s) Oral at bedtime  senna 2 Tablet(s) Oral at bedtime  sodium chloride 0.9%. 1000 milliLiter(s) (50 mL/Hr) IV Continuous <Continuous>  tamsulosin 0.4 milliGRAM(s) Oral at bedtime  venlafaxine XR. 75 milliGRAM(s) Oral daily    Vital Signs Last 24 Hrs  T(C): 36.6 (10 Yefri 2025 15:24), Max: 36.9 (09 Jun 2025 23:00)  T(F): 97.9 (10 Yefri 2025 15:24), Max: 98.4 (09 Jun 2025 23:00)  HR: 66 (10 Yefri 2025 15:24) (57 - 73)  BP: 121/62 (10 Yefri 2025 15:24) (121/62 - 134/62)  BP(mean): 76 (10 Yefri 2025 15:24) (76 - 76)  RR: 18 (10 Yefri 2025 15:24) (17 - 18)  SpO2: 95% (10 Yefri 2025 15:24) (95% - 95%)    Parameters below as of 10 Yefri 2025 15:24  Patient On (Oxygen Delivery Method): room air              PE:  Constitutional: NAD  HEENT: NC/AT, EOMI, PERRLA, conjunctivae clear; ears and nose atraumatic; pharynx benign  Neck: supple; thyroid not palpable  Back: no tenderness  Respiratory: decreased breath sounds rhonchi  Cardiovascular: S1S2 regular, no murmurs  Abdomen: soft, not tender, not distended, positive BS; liver and spleen WNL  Genitourinary: no suprapubic tenderness  Lymphatic: no LN palpable  Musculoskeletal: no muscle tenderness, no joint swelling or tenderness  Extremities: no pedal edema  Neurological/ Psychiatric: moving all extremities  Skin: no rashes; no palpable lesions    Labs: all available labs reviewed                        11.5   5.95  )-----------( 108      ( 09 Jun 2025 08:28 )             35.3     06-09    140  |  109[H]  |  29[H]  ----------------------------<  166[H]  3.6   |  25  |  1.31[H]    Ca    9.1      09 Jun 2025 08:28    TPro  6.4  /  Alb  3.1[L]  /  TBili  1.3[H]  /  DBili  x   /  AST  15  /  ALT  24  /  AlkPhos  61  06-08     LIVER FUNCTIONS - ( 08 Jun 2025 11:00 )  Alb: 3.1 g/dL / Pro: 6.4 gm/dL / ALK PHOS: 61 U/L / ALT: 24 U/L / AST: 15 U/L / GGT: x           Urinalysis Basic - ( 09 Jun 2025 08:28 )    Color: x / Appearance: x / SG: x / pH: x  Gluc: 166 mg/dL / Ketone: x  / Bili: x / Urobili: x   Blood: x / Protein: x / Nitrite: x   Leuk Esterase: x / RBC: x / WBC x   Sq Epi: x / Non Sq Epi: x / Bacteria: x    Culture - Blood (06.08.25 @ 11:00)   - Escherichia coli: Detec  Gram Stain:   Growth in aerobic bottle: Gram Negative Rods  Specimen Source: Blood None  Organism: Blood Culture PCR  Culture Results:   Growth in aerobic bottle: Gram Negative Rods   Direct identification is available within approximately 3-5   hours either by Blood Panel Multiplexed PCR or Direct   MALDI-TOF. Details: [email or web address]  Organism Identification: Blood Culture PCR  Method Type: PCRCulture - Blood (06.08.25 @ 11:00)   Gram Stain:   Growth in anaerobic bottle: Gram Negative Rods  Specimen Source: Blood None  Culture Results:   Growth in anaerobic bottle: Gram Negative Rods    Radiology: all available radiological tests reviewed    ACC: [id number] EXAM:  XR CHEST PORTABLE URGENT 1V   ORDERED BY: GABE   ARAMBULA     PROCEDURE DATE:  06/08/2025          INTERPRETATION:  Chest portable    CLINICAL HISTORY: Sepsis    Comparison:  11/20/2024    Normal heart and mediastinum. Lungs reveal interval development of patchy   left lower lobe infiltrates. Angles sharp. Bones without acute   abnormality.    IMPRESSION: Patchy left lower lobe pneumonia.    --- End of Report ---          < end of copied text >  < from: CT Head No Cont (06.08.25 @ 11:14) >  ACC: [id number] EXAM:  CT BRAIN   ORDERED BY: GABE ARAMBULA     PROCEDURE DATE:  06/08/2025          INTERPRETATION:  CLINICAL STATEMENT: Pain.    TECHNIQUE: CT of the head without IV contrast.    COMPARISON: CT head 9/6/2024    FINDINGS:  There is moderate diffuse parenchymal volume loss.    There are areas of low attenuation in the periventricular white matter   likely related to mild chronic microvascular ischemic changes.    There is no acute intracranial hemorrhage, parenchymal mass, mass effect   or midline shift. There is no acute territorial infarct. There is no   hydrocephalus.    The cranium is intact. The visualized paranasal sinuses are well-aerated.        IMPRESSION:  No acute intracranial hemorrhage or acute territorial infarct.  If   symptoms persist, follow-up MRI exam recommended.    --- End of Report ---    < end of copied text >    Advanced directives addressed: full resuscitation
84 y/o man with PMHx of HLD, HTN, DM, CAD s/p CABG, depression, BPH, vascular dementia, presented to ED from [facility] c/o AMS. Per EMS, pt has been more confused than baseline since he woke up. pt found to meet sepsis criteria 2/2 to LLL pna and uti . Patient was started on IV Antibiotics.     6/9- patient was seen and examined. Oriented x1-2. tolerating diet. denies SOB or chest pain, VSS>     REVIEW OF SYSTEMS:    CONSTITUTIONAL: No weakness, fevers or chills  EYES/ENT: No visual changes;  No vertigo or throat pain   NECK: No pain or stiffness  RESPIRATORY: No cough, wheezing, hemoptysis; No shortness of breath  CARDIOVASCULAR: No chest pain or palpitations  GASTROINTESTINAL: No abdominal or epigastric pain. No nausea, vomiting, or hematemesis; No diarrhea or constipation. No melena or hematochezia.  GENITOURINARY: No dysuria, frequency or hematuria  NEUROLOGICAL: No numbness or weakness  SKIN: No itching, rashes     Vital Signs Last 24 Hrs  T(C): 36.9 (09 Jun 2025 07:52), Max: 39.1 (08 Jun 2025 16:51)  T(F): 98.4 (09 Jun 2025 07:52), Max: 102.4 (08 Jun 2025 16:51)  HR: 85 (09 Jun 2025 07:52) (65 - 100)  BP: 114/59 (09 Jun 2025 07:52) (113/77 - 138/69)  BP(mean): 69 (09 Jun 2025 00:01) (69 - 104)  RR: 19 (09 Jun 2025 00:01) (16 - 19)  SpO2: 98% (09 Jun 2025 00:01) (97% - 99%)    Parameters below as of 09 Jun 2025 00:01  Patient On (Oxygen Delivery Method): room air    PE:  Constitutional: NAD, laying in bed  HEENT: NC/AT  Back: no tenderness  Respiratory: respirations even and non labored, LCTA- diminished at the base   Cardiovascular: S1S2 regular  Abdomen: soft, not tender, not distended, positive BS  Genitourinary: voiding  Musculoskeletal: no muscle tenderness, no joint swelling or tenderness  Extremities: no pedal edema   Neurological: no focal deficits     MEDICATIONS  (STANDING):  amLODIPine   Tablet 5 milliGRAM(s) Oral daily  aspirin enteric coated 81 milliGRAM(s) Oral daily  atorvastatin 10 milliGRAM(s) Oral at bedtime  brimonidine 0.2% Ophthalmic Solution 1 Drop(s) Both EYES every 12 hours  cefTRIAXone Injectable. 2000 milliGRAM(s) IV Push every 24 hours  celecoxib 200 milliGRAM(s) Oral every 12 hours  cetirizine 10 milliGRAM(s) Oral daily  desmopressin 0.1 milliGRAM(s) Oral at bedtime  dextrose 5%. 1000 milliLiter(s) (100 mL/Hr) IV Continuous <Continuous>  dextrose 5%. 1000 milliLiter(s) (50 mL/Hr) IV Continuous <Continuous>  dextrose 50% Injectable 25 Gram(s) IV Push once  dextrose 50% Injectable 12.5 Gram(s) IV Push once  dextrose 50% Injectable 25 Gram(s) IV Push once  donepezil 10 milliGRAM(s) Oral at bedtime  dorzolamide 2% Ophthalmic Solution 1 Drop(s) Both EYES three times a day  doxycycline IVPB 100 milliGRAM(s) IV Intermittent every 12 hours  doxycycline IVPB      finasteride 5 milliGRAM(s) Oral daily  glucagon  Injectable 1 milliGRAM(s) IntraMuscular once  heparin   Injectable 5000 Unit(s) SubCutaneous every 8 hours  insulin lispro (ADMELOG) corrective regimen sliding scale   SubCutaneous three times a day before meals  insulin lispro (ADMELOG) corrective regimen sliding scale   SubCutaneous at bedtime  memantine 10 milliGRAM(s) Oral two times a day  pantoprazole    Tablet 40 milliGRAM(s) Oral before breakfast  polyethylene glycol 3350 17 Gram(s) Oral at bedtime  senna 2 Tablet(s) Oral at bedtime  sodium chloride 0.9%. 1000 milliLiter(s) (50 mL/Hr) IV Continuous <Continuous>  tamsulosin 0.4 milliGRAM(s) Oral at bedtime  venlafaxine XR. 75 milliGRAM(s) Oral daily    MEDICATIONS  (PRN):  acetaminophen     Tablet .. 650 milliGRAM(s) Oral every 6 hours PRN Temp greater or equal to 38C (100.4F), Mild Pain (1 - 3)  aluminum hydroxide/magnesium hydroxide/simethicone Suspension 30 milliLiter(s) Oral every 4 hours PRN Dyspepsia  dextrose Oral Gel 15 Gram(s) Oral once PRN Blood Glucose LESS THAN 70 milliGRAM(s)/deciliter  melatonin 3 milliGRAM(s) Oral at bedtime PRN Insomnia  ondansetron Injectable 4 milliGRAM(s) IV Push every 8 hours PRN Nausea and/or Vomiting      06-09    140  |  109[H]  |  29[H]  ----------------------------<  166[H]  3.6   |  25  |  1.31[H]    Ca    9.1      09 Jun 2025 08:28    TPro  6.4  /  Alb  3.1[L]  /  TBili  1.3[H]  /  DBili  x   /  AST  15  /  ALT  24  /  AlkPhos  61  06-08                        11.5   5.95  )-----------( 108      ( 09 Jun 2025 08:28 )             35.3     Urine Microscopic-Add On (NC) (06.08.25 @ 11:00)   White Blood Cell - Urine: 846 /HPF  Red Blood Cell - Urine: 1 /HPF  Bacteria: Many /HPF  Cast: 4 /LPF  Epithelial Cells: 4 /HPF    Culture - Blood (06.08.25 @ 11:00)   - Escherichia coli: Detec  Gram Stain:   Growth in aerobic bottle: Gram Negative Rods  Specimen Source: Blood None  Organism: Blood Culture PCR  Culture Results:   Growth in aerobic bottle: Gram Negative Rods     Culture - Blood (06.08.25 @ 11:00)   Gram Stain:   Growth in anaerobic bottle: Gram Negative Rods  Specimen Source: Blood None  Culture Results:   Growth in anaerobic bottle: Gram Negative Rods    
86 y/o man with PMHx of HLD, HTN, DM, CAD s/p CABG, depression, BPH, vascular dementia, presented to ED from [facility] c/o AMS. Per EMS, pt has been more confused than baseline since he woke up. pt found to meet sepsis criteria 2/2 to LLL pna and uti . Patient was started on IV Antibiotics.     6/9- patient was seen and examined. Oriented x1-2. tolerating diet. denies SOB or chest pain, VSS>     REVIEW OF SYSTEMS:    CONSTITUTIONAL: as per hpi   EYES/ENT: No visual changes;  No vertigo or throat pain   NECK: No pain or stiffness  RESPIRATORY: No cough, wheezing, hemoptysis; No shortness of breath  CARDIOVASCULAR: No chest pain or palpitations  GASTROINTESTINAL: No abdominal or epigastric pain. No nausea, vomiting, or hematemesis; No diarrhea or constipation. No melena or hematochezia.  GENITOURINARY: No dysuria, frequency or hematuria  NEUROLOGICAL: as per hpi     Vital Signs Last 24 Hrs  T(C): 36.9 (11 Jun 2025 07:58), Max: 37 (10 Yefri 2025 23:22)  T(F): 98.5 (11 Jun 2025 07:58), Max: 98.6 (10 Yefri 2025 23:22)  HR: 70 (11 Jun 2025 07:58) (66 - 74)  BP: 135/70 (11 Jun 2025 07:58) (121/62 - 140/73)  BP(mean): 76 (10 Yefri 2025 15:24) (76 - 76)  RR: 18 (11 Jun 2025 07:58) (18 - 18)  SpO2: 98% (11 Jun 2025 07:58) (95% - 98%)    Parameters below as of 11 Jun 2025 07:58  Patient On (Oxygen Delivery Method): room air        PE:  Constitutional: NAD, laying in bed  HEENT: NC/AT  Back: no tenderness  Respiratory: respirations even and non labored, LCTA- diminished at the base   Cardiovascular: S1S2 regular  Abdomen: soft, not tender, not distended, positive BS  Genitourinary: voiding  Musculoskeletal: no muscle tenderness, no joint swelling or tenderness  Extremities: no pedal edema   Neurological: no focal deficits                                      11.1   5.04  )-----------( 117      ( 10 Yefri 2025 08:50 )             34.0     06-10    142  |  112[H]  |  26[H]  ----------------------------<  117[H]  3.5   |  24  |  1.09    Ca    8.6      10 Yefri 2025 08:50            Urinalysis Basic - ( 10 Yefri 2025 08:50 )    Color: x / Appearance: x / SG: x / pH: x  Gluc: 117 mg/dL / Ketone: x  / Bili: x / Urobili: x   Blood: x / Protein: x / Nitrite: x   Leuk Esterase: x / RBC: x / WBC x   Sq Epi: x / Non Sq Epi: x / Bacteria: x          CAPILLARY BLOOD GLUCOSE      POCT Blood Glucose.: 128 mg/dL (11 Jun 2025 11:50)      MEDICATIONS  (STANDING):  amLODIPine   Tablet 5 milliGRAM(s) Oral daily  aspirin enteric coated 81 milliGRAM(s) Oral daily  atorvastatin 10 milliGRAM(s) Oral at bedtime  brimonidine 0.2% Ophthalmic Solution 1 Drop(s) Both EYES every 12 hours  cefTRIAXone Injectable. 2000 milliGRAM(s) IV Push every 24 hours  celecoxib 200 milliGRAM(s) Oral every 12 hours  cetirizine 10 milliGRAM(s) Oral daily  desmopressin 0.1 milliGRAM(s) Oral at bedtime  dextrose 5%. 1000 milliLiter(s) (100 mL/Hr) IV Continuous <Continuous>  dextrose 5%. 1000 milliLiter(s) (50 mL/Hr) IV Continuous <Continuous>  dextrose 50% Injectable 25 Gram(s) IV Push once  dextrose 50% Injectable 12.5 Gram(s) IV Push once  dextrose 50% Injectable 25 Gram(s) IV Push once  donepezil 10 milliGRAM(s) Oral at bedtime  dorzolamide 2% Ophthalmic Solution 1 Drop(s) Both EYES three times a day  doxycycline IVPB 100 milliGRAM(s) IV Intermittent every 12 hours  doxycycline IVPB      finasteride 5 milliGRAM(s) Oral daily  glucagon  Injectable 1 milliGRAM(s) IntraMuscular once  heparin   Injectable 5000 Unit(s) SubCutaneous every 8 hours  insulin lispro (ADMELOG) corrective regimen sliding scale   SubCutaneous three times a day before meals  insulin lispro (ADMELOG) corrective regimen sliding scale   SubCutaneous at bedtime  memantine 10 milliGRAM(s) Oral two times a day  pantoprazole    Tablet 40 milliGRAM(s) Oral before breakfast  polyethylene glycol 3350 17 Gram(s) Oral at bedtime  senna 2 Tablet(s) Oral at bedtime  sodium chloride 0.9%. 1000 milliLiter(s) (50 mL/Hr) IV Continuous <Continuous>  tamsulosin 0.4 milliGRAM(s) Oral at bedtime  venlafaxine XR. 75 milliGRAM(s) Oral daily    MEDICATIONS  (PRN):  acetaminophen     Tablet .. 650 milliGRAM(s) Oral every 6 hours PRN Temp greater or equal to 38C (100.4F), Mild Pain (1 - 3)  aluminum hydroxide/magnesium hydroxide/simethicone Suspension 30 milliLiter(s) Oral every 4 hours PRN Dyspepsia  dextrose Oral Gel 15 Gram(s) Oral once PRN Blood Glucose LESS THAN 70 milliGRAM(s)/deciliter  melatonin 3 milliGRAM(s) Oral at bedtime PRN Insomnia  ondansetron Injectable 4 milliGRAM(s) IV Push every 8 hours PRN Nausea and/or Vomiting  
86 y/o man with PMHx of HLD, HTN, DM, CAD s/p CABG, depression, BPH, vascular dementia, presented to ED from [facility] c/o AMS. Per EMS, pt has been more confused than baseline since he woke up. pt found to meet sepsis criteria 2/2 to LLL pna and uti . Patient was started on IV Antibiotics.     6/9- patient was seen and examined. Oriented x1-2. tolerating diet. denies SOB or chest pain, VSS>     REVIEW OF SYSTEMS:    CONSTITUTIONAL: as per hpi   EYES/ENT: No visual changes;  No vertigo or throat pain   NECK: No pain or stiffness  RESPIRATORY: No cough, wheezing, hemoptysis; No shortness of breath  CARDIOVASCULAR: No chest pain or palpitations  GASTROINTESTINAL: No abdominal or epigastric pain. No nausea, vomiting, or hematemesis; No diarrhea or constipation. No melena or hematochezia.  GENITOURINARY: No dysuria, frequency or hematuria  NEUROLOGICAL: as per hpi   SKIN: No itching, rashes   Vital Signs Last 24 Hrs  T(C): 36.6 (10 Yefri 2025 15:24), Max: 36.9 (09 Jun 2025 23:00)  T(F): 97.9 (10 Yefri 2025 15:24), Max: 98.4 (09 Jun 2025 23:00)  HR: 66 (10 Yefri 2025 15:24) (57 - 73)  BP: 121/62 (10 Yefri 2025 15:24) (121/62 - 134/62)  BP(mean): 76 (10 Yefri 2025 15:24) (76 - 76)  RR: 18 (10 Yefri 2025 15:24) (17 - 18)  SpO2: 95% (10 Yefri 2025 15:24) (95% - 95%)    Parameters below as of 10 Yefri 2025 15:24  Patient On (Oxygen Delivery Method): room air      PE:  Constitutional: NAD, laying in bed  HEENT: NC/AT  Back: no tenderness  Respiratory: respirations even and non labored, LCTA- diminished at the base   Cardiovascular: S1S2 regular  Abdomen: soft, not tender, not distended, positive BS  Genitourinary: voiding  Musculoskeletal: no muscle tenderness, no joint swelling or tenderness  Extremities: no pedal edema   Neurological: no focal deficits                             11.1   5.04  )-----------( 117      ( 10 Yefri 2025 08:50 )             34.0     06-10    142  |  112[H]  |  26[H]  ----------------------------<  117[H]  3.5   |  24  |  1.09    Ca    8.6      10 Yefri 2025 08:50          Urinalysis with Rflx Culture (collected 08 Jun 2025 11:00)    Culture - Blood (collected 08 Jun 2025 11:00)  Source: Blood None  Gram Stain (09 Jun 2025 06:49):    Growth in aerobic bottle: Gram Negative Rods  Preliminary Report (10 Yefri 2025 10:31):    Growth in aerobic bottle: Escherichia coli    Direct identification is available within approximately 3-5    hours either by Blood Panel Multiplexed PCR or Direct    MALDI-TOF. Details: [email or web address]  Organism: Blood Culture PCR (09 Jun 2025 06:55)  Organism: Blood Culture PCR (09 Jun 2025 06:55)    Culture - Urine (collected 08 Jun 2025 11:00)  Source: Urine None  Preliminary Report (09 Jun 2025 17:44):    >100,000 CFU/ml Escherichia coli    Culture - Blood (collected 08 Jun 2025 11:00)  Source: Blood None  Gram Stain (09 Jun 2025 08:21):    Growth in anaerobic bottle: Gram Negative Rods  Preliminary Report (10 Yefri 2025 13:58):    Growth in anaerobic bottle: Escherichia coli    See previous culture [id number]        Urinalysis Basic - ( 10 Yefri 2025 08:50 )    Color: x / Appearance: x / SG: x / pH: x  Gluc: 117 mg/dL / Ketone: x  / Bili: x / Urobili: x   Blood: x / Protein: x / Nitrite: x   Leuk Esterase: x / RBC: x / WBC x   Sq Epi: x / Non Sq Epi: x / Bacteria: x          CAPILLARY BLOOD GLUCOSE      POCT Blood Glucose.: 141 mg/dL (10 Yefri 2025 11:11)      Urinalysis with Rflx Culture (collected 08 Jun 2025 11:00)    Culture - Blood (collected 08 Jun 2025 11:00)  Source: Blood None  Gram Stain (09 Jun 2025 06:49):    Growth in aerobic bottle: Gram Negative Rods  Preliminary Report (10 Yefri 2025 10:31):    Growth in aerobic bottle: Escherichia coli    Direct identification is available within approximately 3-5    hours either by Blood Panel Multiplexed PCR or Direct    MALDI-TOF. Details: [email or web address]  Organism: Blood Culture PCR (09 Jun 2025 06:55)  Organism: Blood Culture PCR (09 Jun 2025 06:55)    Culture - Urine (collected 08 Jun 2025 11:00)  Source: Urine None  Preliminary Report (09 Jun 2025 17:44):    >100,000 CFU/ml Escherichia coli    Culture - Blood (collected 08 Jun 2025 11:00)  Source: Blood None  Gram Stain (09 Jun 2025 08:21):    Growth in anaerobic bottle: Gram Negative Rods  Preliminary Report (10 Yefri 2025 13:58):    Growth in anaerobic bottle: Escherichia coli    See previous culture [id number]      MEDICATIONS  (STANDING):  amLODIPine   Tablet 5 milliGRAM(s) Oral daily  aspirin enteric coated 81 milliGRAM(s) Oral daily  atorvastatin 10 milliGRAM(s) Oral at bedtime  brimonidine 0.2% Ophthalmic Solution 1 Drop(s) Both EYES every 12 hours  cefTRIAXone Injectable. 2000 milliGRAM(s) IV Push every 24 hours  celecoxib 200 milliGRAM(s) Oral every 12 hours  cetirizine 10 milliGRAM(s) Oral daily  desmopressin 0.1 milliGRAM(s) Oral at bedtime  dextrose 5%. 1000 milliLiter(s) (100 mL/Hr) IV Continuous <Continuous>  dextrose 5%. 1000 milliLiter(s) (50 mL/Hr) IV Continuous <Continuous>  dextrose 50% Injectable 25 Gram(s) IV Push once  dextrose 50% Injectable 12.5 Gram(s) IV Push once  dextrose 50% Injectable 25 Gram(s) IV Push once  donepezil 10 milliGRAM(s) Oral at bedtime  dorzolamide 2% Ophthalmic Solution 1 Drop(s) Both EYES three times a day  doxycycline IVPB 100 milliGRAM(s) IV Intermittent every 12 hours  doxycycline IVPB      finasteride 5 milliGRAM(s) Oral daily  glucagon  Injectable 1 milliGRAM(s) IntraMuscular once  heparin   Injectable 5000 Unit(s) SubCutaneous every 8 hours  insulin lispro (ADMELOG) corrective regimen sliding scale   SubCutaneous three times a day before meals  insulin lispro (ADMELOG) corrective regimen sliding scale   SubCutaneous at bedtime  memantine 10 milliGRAM(s) Oral two times a day  pantoprazole    Tablet 40 milliGRAM(s) Oral before breakfast  polyethylene glycol 3350 17 Gram(s) Oral at bedtime  senna 2 Tablet(s) Oral at bedtime  sodium chloride 0.9%. 1000 milliLiter(s) (50 mL/Hr) IV Continuous <Continuous>  tamsulosin 0.4 milliGRAM(s) Oral at bedtime  venlafaxine XR. 75 milliGRAM(s) Oral daily    MEDICATIONS  (PRN):  acetaminophen     Tablet .. 650 milliGRAM(s) Oral every 6 hours PRN Temp greater or equal to 38C (100.4F), Mild Pain (1 - 3)  aluminum hydroxide/magnesium hydroxide/simethicone Suspension 30 milliLiter(s) Oral every 4 hours PRN Dyspepsia  dextrose Oral Gel 15 Gram(s) Oral once PRN Blood Glucose LESS THAN 70 milliGRAM(s)/deciliter  melatonin 3 milliGRAM(s) Oral at bedtime PRN Insomnia  ondansetron Injectable 4 milliGRAM(s) IV Push every 8 hours PRN Nausea and/or Vomiting      
Date of service: 06-10-25 @ 16:48    pt seen and examined  afebrile  no o/n events     ROS: no fever or chills; denies dizziness, no HA, no SOB or cough, no abdominal pain, no legs pain, no rashes    MEDICATIONS  (STANDING):  amLODIPine   Tablet 5 milliGRAM(s) Oral daily  aspirin enteric coated 81 milliGRAM(s) Oral daily  atorvastatin 10 milliGRAM(s) Oral at bedtime  brimonidine 0.2% Ophthalmic Solution 1 Drop(s) Both EYES every 12 hours  cefTRIAXone Injectable. 2000 milliGRAM(s) IV Push every 24 hours  celecoxib 200 milliGRAM(s) Oral every 12 hours  cetirizine 10 milliGRAM(s) Oral daily  desmopressin 0.1 milliGRAM(s) Oral at bedtime  dextrose 5%. 1000 milliLiter(s) (100 mL/Hr) IV Continuous <Continuous>  dextrose 5%. 1000 milliLiter(s) (50 mL/Hr) IV Continuous <Continuous>  dextrose 50% Injectable 25 Gram(s) IV Push once  dextrose 50% Injectable 12.5 Gram(s) IV Push once  dextrose 50% Injectable 25 Gram(s) IV Push once  donepezil 10 milliGRAM(s) Oral at bedtime  dorzolamide 2% Ophthalmic Solution 1 Drop(s) Both EYES three times a day  doxycycline IVPB 100 milliGRAM(s) IV Intermittent every 12 hours  doxycycline IVPB      finasteride 5 milliGRAM(s) Oral daily  glucagon  Injectable 1 milliGRAM(s) IntraMuscular once  heparin   Injectable 5000 Unit(s) SubCutaneous every 8 hours  insulin lispro (ADMELOG) corrective regimen sliding scale   SubCutaneous three times a day before meals  insulin lispro (ADMELOG) corrective regimen sliding scale   SubCutaneous at bedtime  memantine 10 milliGRAM(s) Oral two times a day  pantoprazole    Tablet 40 milliGRAM(s) Oral before breakfast  polyethylene glycol 3350 17 Gram(s) Oral at bedtime  senna 2 Tablet(s) Oral at bedtime  sodium chloride 0.9%. 1000 milliLiter(s) (50 mL/Hr) IV Continuous <Continuous>  tamsulosin 0.4 milliGRAM(s) Oral at bedtime  venlafaxine XR. 75 milliGRAM(s) Oral daily    Vital Signs Last 24 Hrs  T(C): 36.6 (10 Yefri 2025 15:24), Max: 36.9 (09 Jun 2025 23:00)  T(F): 97.9 (10 Yefri 2025 15:24), Max: 98.4 (09 Jun 2025 23:00)  HR: 66 (10 Yefri 2025 15:24) (57 - 73)  BP: 121/62 (10 Yefri 2025 15:24) (121/62 - 134/62)  BP(mean): 76 (10 Yefri 2025 15:24) (76 - 76)  RR: 18 (10 Yefri 2025 15:24) (17 - 18)  SpO2: 95% (10 Yefri 2025 15:24) (95% - 95%)    Parameters below as of 10 Yefri 2025 15:24  Patient On (Oxygen Delivery Method): room air      PE:  Constitutional: NAD  HEENT: NC/AT, EOMI, PERRLA, conjunctivae clear; ears and nose atraumatic; pharynx benign  Neck: supple; thyroid not palpable  Back: no tenderness  Respiratory: decreased breath sounds rhonchi  Cardiovascular: S1S2 regular, no murmurs  Abdomen: soft, not tender, not distended, positive BS; liver and spleen WNL  Genitourinary: no suprapubic tenderness  Lymphatic: no LN palpable  Musculoskeletal: no muscle tenderness, no joint swelling or tenderness  Extremities: no pedal edema  Neurological/ Psychiatric: moving all extremities  Skin: no rashes; no palpable lesions    Labs: all available labs reviewed               Urinalysis Basic - ( 09 Jun 2025 08:28 )    Color: x / Appearance: x / SG: x / pH: x  Gluc: 166 mg/dL / Ketone: x  / Bili: x / Urobili: x   Blood: x / Protein: x / Nitrite: x   Leuk Esterase: x / RBC: x / WBC x   Sq Epi: x / Non Sq Epi: x / Bacteria: x    Culture - Blood (06.08.25 @ 11:00)   - Escherichia coli: Detec  Gram Stain:   Growth in aerobic bottle: Gram Negative Rods  Specimen Source: Blood None  Organism: Blood Culture PCR  Culture Results:   Growth in aerobic bottle: Gram Negative Rods   Direct identification is available within approximately 3-5   hours either by Blood Panel Multiplexed PCR or Direct   MALDI-TOF. Details: [email or web address]  Organism Identification: Blood Culture PCR  Method Type: PCRCulture - Blood (06.08.25 @ 11:00)   Gram Stain:   Growth in anaerobic bottle: Gram Negative Rods  Specimen Source: Blood None  Culture Results:   Growth in anaerobic bottle: Gram Negative Rods    Radiology: all available radiological tests reviewed    ACC: [id number] EXAM:  XR CHEST PORTABLE URGENT 1V   ORDERED BY: GABE ARAMBULA     PROCEDURE DATE:  06/08/2025          INTERPRETATION:  Chest portable    CLINICAL HISTORY: Sepsis    Comparison:  11/20/2024    Normal heart and mediastinum. Lungs reveal interval development of patchy   left lower lobe infiltrates. Angles sharp. Bones without acute   abnormality.    IMPRESSION: Patchy left lower lobe pneumonia.    --- End of Report ---          < end of copied text >  < from: CT Head No Cont (06.08.25 @ 11:14) >  ACC: [id number] EXAM:  CT BRAIN   ORDERED BY: GABE ARAMBULA     PROCEDURE DATE:  06/08/2025          INTERPRETATION:  CLINICAL STATEMENT: Pain.    TECHNIQUE: CT of the head without IV contrast.    COMPARISON: CT head 9/6/2024    FINDINGS:  There is moderate diffuse parenchymal volume loss.    There are areas of low attenuation in the periventricular white matter   likely related to mild chronic microvascular ischemic changes.    There is no acute intracranial hemorrhage, parenchymal mass, mass effect   or midline shift. There is no acute territorial infarct. There is no   hydrocephalus.    The cranium is intact. The visualized paranasal sinuses are well-aerated.        IMPRESSION:  No acute intracranial hemorrhage or acute territorial infarct.  If   symptoms persist, follow-up MRI exam recommended.    --- End of Report ---    < end of copied text >    Advanced directives addressed: full resuscitation

## 2025-06-18 DIAGNOSIS — I10 ESSENTIAL (PRIMARY) HYPERTENSION: ICD-10-CM

## 2025-06-18 DIAGNOSIS — J18.9 PNEUMONIA, UNSPECIFIED ORGANISM: ICD-10-CM

## 2025-06-18 DIAGNOSIS — F32.A DEPRESSION, UNSPECIFIED: ICD-10-CM

## 2025-06-18 DIAGNOSIS — G93.41 METABOLIC ENCEPHALOPATHY: ICD-10-CM

## 2025-06-18 DIAGNOSIS — I44.0 ATRIOVENTRICULAR BLOCK, FIRST DEGREE: ICD-10-CM

## 2025-06-18 DIAGNOSIS — Z79.02 LONG TERM (CURRENT) USE OF ANTITHROMBOTICS/ANTIPLATELETS: ICD-10-CM

## 2025-06-18 DIAGNOSIS — A41.51 SEPSIS DUE TO ESCHERICHIA COLI [E. COLI]: ICD-10-CM

## 2025-06-18 DIAGNOSIS — N40.0 BENIGN PROSTATIC HYPERPLASIA WITHOUT LOWER URINARY TRACT SYMPTOMS: ICD-10-CM

## 2025-06-18 DIAGNOSIS — R65.20 SEVERE SEPSIS WITHOUT SEPTIC SHOCK: ICD-10-CM

## 2025-06-18 DIAGNOSIS — I24.89 OTHER FORMS OF ACUTE ISCHEMIC HEART DISEASE: ICD-10-CM

## 2025-06-18 DIAGNOSIS — Z66 DO NOT RESUSCITATE: ICD-10-CM

## 2025-06-18 DIAGNOSIS — D64.9 ANEMIA, UNSPECIFIED: ICD-10-CM

## 2025-06-18 DIAGNOSIS — E78.5 HYPERLIPIDEMIA, UNSPECIFIED: ICD-10-CM

## 2025-06-18 DIAGNOSIS — Z79.82 LONG TERM (CURRENT) USE OF ASPIRIN: ICD-10-CM

## 2025-06-18 DIAGNOSIS — N17.9 ACUTE KIDNEY FAILURE, UNSPECIFIED: ICD-10-CM

## 2025-06-18 DIAGNOSIS — E11.9 TYPE 2 DIABETES MELLITUS WITHOUT COMPLICATIONS: ICD-10-CM

## 2025-06-18 DIAGNOSIS — Z95.1 PRESENCE OF AORTOCORONARY BYPASS GRAFT: ICD-10-CM

## 2025-06-18 DIAGNOSIS — H40.9 UNSPECIFIED GLAUCOMA: ICD-10-CM

## 2025-06-18 DIAGNOSIS — N39.0 URINARY TRACT INFECTION, SITE NOT SPECIFIED: ICD-10-CM

## 2025-06-18 DIAGNOSIS — F01.50 VASCULAR DEMENTIA, UNSPECIFIED SEVERITY, WITHOUT BEHAVIORAL DISTURBANCE, PSYCHOTIC DISTURBANCE, MOOD DISTURBANCE, AND ANXIETY: ICD-10-CM

## 2025-06-18 DIAGNOSIS — I25.10 ATHEROSCLEROTIC HEART DISEASE OF NATIVE CORONARY ARTERY WITHOUT ANGINA PECTORIS: ICD-10-CM

## 2025-07-26 RX ORDER — CEFUROXIME SODIUM 1.5 G
1 VIAL (EA) INJECTION
Refills: 0 | DISCHARGE
Start: 2025-07-26 | End: 2025-08-01

## 2025-07-30 ENCOUNTER — INPATIENT (INPATIENT)
Facility: HOSPITAL | Age: 85
LOS: 1 days | DRG: 556 | End: 2025-08-01
Attending: STUDENT IN AN ORGANIZED HEALTH CARE EDUCATION/TRAINING PROGRAM | Admitting: STUDENT IN AN ORGANIZED HEALTH CARE EDUCATION/TRAINING PROGRAM
Payer: OTHER GOVERNMENT

## 2025-07-30 VITALS
SYSTOLIC BLOOD PRESSURE: 140 MMHG | DIASTOLIC BLOOD PRESSURE: 70 MMHG | TEMPERATURE: 99 F | OXYGEN SATURATION: 96 % | HEIGHT: 71 IN | HEART RATE: 82 BPM | RESPIRATION RATE: 16 BRPM | WEIGHT: 179.9 LBS

## 2025-07-30 DIAGNOSIS — R26.2 DIFFICULTY IN WALKING, NOT ELSEWHERE CLASSIFIED: ICD-10-CM

## 2025-07-30 DIAGNOSIS — M48.54XA COLLAPSED VERTEBRA, NOT ELSEWHERE CLASSIFIED, THORACIC REGION, INITIAL ENCOUNTER FOR FRACTURE: ICD-10-CM

## 2025-07-30 DIAGNOSIS — M25.559 PAIN IN UNSPECIFIED HIP: ICD-10-CM

## 2025-07-30 DIAGNOSIS — Z79.02 LONG TERM (CURRENT) USE OF ANTITHROMBOTICS/ANTIPLATELETS: ICD-10-CM

## 2025-07-30 DIAGNOSIS — F02.80 DEMENTIA IN OTHER DISEASES CLASSIFIED ELSEWHERE, UNSPECIFIED SEVERITY, WITHOUT BEHAVIORAL DISTURBANCE, PSYCHOTIC DISTURBANCE, MOOD DISTURBANCE, AND ANXIETY: ICD-10-CM

## 2025-07-30 DIAGNOSIS — Z66 DO NOT RESUSCITATE: ICD-10-CM

## 2025-07-30 DIAGNOSIS — N21.0 CALCULUS IN BLADDER: ICD-10-CM
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
## 2025-07-30 DIAGNOSIS — M19.072 PRIMARY OSTEOARTHRITIS, LEFT ANKLE AND FOOT: ICD-10-CM
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
## 2025-07-30 DIAGNOSIS — I25.10 ATHEROSCLEROTIC HEART DISEASE OF NATIVE CORONARY ARTERY WITHOUT ANGINA PECTORIS: ICD-10-CM

## 2025-07-30 DIAGNOSIS — F32.A DEPRESSION, UNSPECIFIED: ICD-10-CM

## 2025-07-30 DIAGNOSIS — Z90.89 ACQUIRED ABSENCE OF OTHER ORGANS: Chronic | ICD-10-CM
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
## 2025-07-30 DIAGNOSIS — M96.1 POSTLAMINECTOMY SYNDROME, NOT ELSEWHERE CLASSIFIED: ICD-10-CM
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
## 2025-07-30 DIAGNOSIS — E78.5 HYPERLIPIDEMIA, UNSPECIFIED: ICD-10-CM

## 2025-07-30 DIAGNOSIS — N30.90 CYSTITIS, UNSPECIFIED WITHOUT HEMATURIA: ICD-10-CM

## 2025-07-30 DIAGNOSIS — G31.83 NEUROCOGNITIVE DISORDER WITH LEWY BODIES: ICD-10-CM

## 2025-07-30 DIAGNOSIS — Z95.1 PRESENCE OF AORTOCORONARY BYPASS GRAFT: ICD-10-CM

## 2025-07-30 DIAGNOSIS — I10 ESSENTIAL (PRIMARY) HYPERTENSION: ICD-10-CM

## 2025-07-30 DIAGNOSIS — M25.552 PAIN IN LEFT HIP: ICD-10-CM

## 2025-07-30 DIAGNOSIS — Z98.890 OTHER SPECIFIED POSTPROCEDURAL STATES: Chronic | ICD-10-CM

## 2025-07-30 DIAGNOSIS — Z79.899 OTHER LONG TERM (CURRENT) DRUG THERAPY: ICD-10-CM

## 2025-07-30 DIAGNOSIS — Z79.82 LONG TERM (CURRENT) USE OF ASPIRIN: ICD-10-CM

## 2025-07-30 DIAGNOSIS — H40.9 UNSPECIFIED GLAUCOMA: ICD-10-CM

## 2025-07-30 LAB
ALBUMIN SERPL ELPH-MCNC: 3.4 G/DL — SIGNIFICANT CHANGE UP (ref 3.3–5)
ALP SERPL-CCNC: 78 U/L — SIGNIFICANT CHANGE UP (ref 40–120)
ALT FLD-CCNC: 16 U/L — SIGNIFICANT CHANGE UP (ref 12–78)
ANION GAP SERPL CALC-SCNC: 6 MMOL/L — SIGNIFICANT CHANGE UP (ref 5–17)
APPEARANCE UR: CLEAR — SIGNIFICANT CHANGE UP
APTT BLD: 32.3 SEC — SIGNIFICANT CHANGE UP (ref 26.1–36.8)
AST SERPL-CCNC: 10 U/L — LOW (ref 15–37)
BASOPHILS # BLD AUTO: 0.03 K/UL — SIGNIFICANT CHANGE UP (ref 0–0.2)
BASOPHILS NFR BLD AUTO: 0.3 % — SIGNIFICANT CHANGE UP (ref 0–2)
BILIRUB SERPL-MCNC: 0.7 MG/DL — SIGNIFICANT CHANGE UP (ref 0.2–1.2)
BILIRUB UR-MCNC: NEGATIVE — SIGNIFICANT CHANGE UP
BLD GP AB SCN SERPL QL: SIGNIFICANT CHANGE UP
BUN SERPL-MCNC: 17 MG/DL — SIGNIFICANT CHANGE UP (ref 7–23)
CALCIUM SERPL-MCNC: 8.8 MG/DL — SIGNIFICANT CHANGE UP (ref 8.5–10.1)
CHLORIDE SERPL-SCNC: 106 MMOL/L — SIGNIFICANT CHANGE UP (ref 96–108)
CO2 SERPL-SCNC: 27 MMOL/L — SIGNIFICANT CHANGE UP (ref 22–31)
COLOR SPEC: YELLOW — SIGNIFICANT CHANGE UP
CREAT SERPL-MCNC: 1.1 MG/DL — SIGNIFICANT CHANGE UP (ref 0.5–1.3)
DIFF PNL FLD: NEGATIVE — SIGNIFICANT CHANGE UP
EGFR: 66 ML/MIN/1.73M2 — SIGNIFICANT CHANGE UP
EGFR: 66 ML/MIN/1.73M2 — SIGNIFICANT CHANGE UP
EOSINOPHIL # BLD AUTO: 0.27 K/UL — SIGNIFICANT CHANGE UP (ref 0–0.5)
EOSINOPHIL NFR BLD AUTO: 3 % — SIGNIFICANT CHANGE UP (ref 0–6)
GLUCOSE SERPL-MCNC: 123 MG/DL — HIGH (ref 70–99)
GLUCOSE UR QL: NEGATIVE MG/DL — SIGNIFICANT CHANGE UP
HCT VFR BLD CALC: 34.6 % — LOW (ref 39–50)
HGB BLD-MCNC: 11.8 G/DL — LOW (ref 13–17)
IMM GRANULOCYTES # BLD AUTO: 0.02 K/UL — SIGNIFICANT CHANGE UP (ref 0–0.07)
IMM GRANULOCYTES NFR BLD AUTO: 0.2 % — SIGNIFICANT CHANGE UP (ref 0–0.9)
INR BLD: 1.16 RATIO — SIGNIFICANT CHANGE UP (ref 0.85–1.16)
KETONES UR QL: NEGATIVE MG/DL — SIGNIFICANT CHANGE UP
LEUKOCYTE ESTERASE UR-ACNC: NEGATIVE — SIGNIFICANT CHANGE UP
LYMPHOCYTES # BLD AUTO: 0.94 K/UL — LOW (ref 1–3.3)
LYMPHOCYTES NFR BLD AUTO: 10.4 % — LOW (ref 13–44)
MCHC RBC-ENTMCNC: 29.4 PG — SIGNIFICANT CHANGE UP (ref 27–34)
MCHC RBC-ENTMCNC: 34.1 G/DL — SIGNIFICANT CHANGE UP (ref 32–36)
MCV RBC AUTO: 86.3 FL — SIGNIFICANT CHANGE UP (ref 80–100)
MONOCYTES # BLD AUTO: 0.8 K/UL — SIGNIFICANT CHANGE UP (ref 0–0.9)
MONOCYTES NFR BLD AUTO: 8.9 % — SIGNIFICANT CHANGE UP (ref 2–14)
NEUTROPHILS # BLD AUTO: 6.97 K/UL — SIGNIFICANT CHANGE UP (ref 1.8–7.4)
NEUTROPHILS NFR BLD AUTO: 77.2 % — HIGH (ref 43–77)
NITRITE UR-MCNC: NEGATIVE — SIGNIFICANT CHANGE UP
NRBC # BLD AUTO: 0 K/UL — SIGNIFICANT CHANGE UP (ref 0–0)
NRBC # FLD: 0 K/UL — SIGNIFICANT CHANGE UP (ref 0–0)
NRBC BLD AUTO-RTO: 0 /100 WBCS — SIGNIFICANT CHANGE UP (ref 0–0)
PH UR: 7 — SIGNIFICANT CHANGE UP (ref 5–8)
PLATELET # BLD AUTO: 200 K/UL — SIGNIFICANT CHANGE UP (ref 150–400)
PMV BLD: 9.9 FL — SIGNIFICANT CHANGE UP (ref 7–13)
POTASSIUM SERPL-MCNC: 3.9 MMOL/L — SIGNIFICANT CHANGE UP (ref 3.5–5.3)
POTASSIUM SERPL-SCNC: 3.9 MMOL/L — SIGNIFICANT CHANGE UP (ref 3.5–5.3)
PROT SERPL-MCNC: 6.6 GM/DL — SIGNIFICANT CHANGE UP (ref 6–8.3)
PROT UR-MCNC: NEGATIVE MG/DL — SIGNIFICANT CHANGE UP
PROTHROM AB SERPL-ACNC: 13.7 SEC — HIGH (ref 9.9–13.4)
RBC # BLD: 4.01 M/UL — LOW (ref 4.2–5.8)
RBC # FLD: 14.1 % — SIGNIFICANT CHANGE UP (ref 10.3–14.5)
SODIUM SERPL-SCNC: 139 MMOL/L — SIGNIFICANT CHANGE UP (ref 135–145)
SP GR SPEC: 1.02 — SIGNIFICANT CHANGE UP (ref 1–1.03)
UROBILINOGEN FLD QL: 0.2 MG/DL — SIGNIFICANT CHANGE UP (ref 0.2–1)
WBC # BLD: 9.03 K/UL — SIGNIFICANT CHANGE UP (ref 3.8–10.5)
WBC # FLD AUTO: 9.03 K/UL — SIGNIFICANT CHANGE UP (ref 3.8–10.5)

## 2025-07-30 PROCEDURE — 97530 THERAPEUTIC ACTIVITIES: CPT | Mod: GP

## 2025-07-30 PROCEDURE — 73552 X-RAY EXAM OF FEMUR 2/>: CPT | Mod: 26,LT

## 2025-07-30 PROCEDURE — 99285 EMERGENCY DEPT VISIT HI MDM: CPT

## 2025-07-30 PROCEDURE — 74177 CT ABD & PELVIS W/CONTRAST: CPT | Mod: 26

## 2025-07-30 PROCEDURE — 93010 ELECTROCARDIOGRAM REPORT: CPT

## 2025-07-30 PROCEDURE — 73502 X-RAY EXAM HIP UNI 2-3 VIEWS: CPT | Mod: 26,LT

## 2025-07-30 PROCEDURE — 70450 CT HEAD/BRAIN W/O DYE: CPT | Mod: 26

## 2025-07-30 PROCEDURE — 73610 X-RAY EXAM OF ANKLE: CPT | Mod: 26,LT

## 2025-07-30 PROCEDURE — 73620 X-RAY EXAM OF FOOT: CPT | Mod: 26,LT

## 2025-07-30 PROCEDURE — 97116 GAIT TRAINING THERAPY: CPT | Mod: GP

## 2025-07-30 PROCEDURE — 99223 1ST HOSP IP/OBS HIGH 75: CPT

## 2025-07-30 PROCEDURE — 99497 ADVNCD CARE PLAN 30 MIN: CPT | Mod: 25

## 2025-07-30 PROCEDURE — 72125 CT NECK SPINE W/O DYE: CPT | Mod: 26

## 2025-07-30 PROCEDURE — 71260 CT THORAX DX C+: CPT | Mod: 26

## 2025-07-30 PROCEDURE — 97162 PT EVAL MOD COMPLEX 30 MIN: CPT | Mod: GP

## 2025-07-30 RX ORDER — ATORVASTATIN CALCIUM 80 MG/1
10 TABLET, FILM COATED ORAL AT BEDTIME
Refills: 0 | Status: DISCONTINUED | OUTPATIENT
Start: 2025-07-30 | End: 2025-08-01

## 2025-07-30 RX ORDER — CEFUROXIME SODIUM 1.5 G
250 VIAL (EA) INJECTION EVERY 12 HOURS
Refills: 0 | Status: COMPLETED | OUTPATIENT
Start: 2025-07-30 | End: 2025-08-01

## 2025-07-30 RX ORDER — ASPIRIN 325 MG
81 TABLET ORAL DAILY
Refills: 0 | Status: DISCONTINUED | OUTPATIENT
Start: 2025-07-30 | End: 2025-08-01

## 2025-07-30 RX ORDER — ACETAMINOPHEN 500 MG/5ML
1000 LIQUID (ML) ORAL ONCE
Refills: 0 | Status: COMPLETED | OUTPATIENT
Start: 2025-07-30 | End: 2025-07-30

## 2025-07-30 RX ORDER — AMLODIPINE BESYLATE 10 MG/1
5 TABLET ORAL DAILY
Refills: 0 | Status: DISCONTINUED | OUTPATIENT
Start: 2025-07-30 | End: 2025-08-01

## 2025-07-30 RX ORDER — VENLAFAXINE HYDROCHLORIDE 37.5 MG/1
75 CAPSULE, EXTENDED RELEASE ORAL DAILY
Refills: 0 | Status: DISCONTINUED | OUTPATIENT
Start: 2025-07-30 | End: 2025-08-01

## 2025-07-30 RX ORDER — TAMSULOSIN HYDROCHLORIDE 0.4 MG/1
0.4 CAPSULE ORAL AT BEDTIME
Refills: 0 | Status: DISCONTINUED | OUTPATIENT
Start: 2025-07-30 | End: 2025-08-01

## 2025-07-30 RX ORDER — VENLAFAXINE HYDROCHLORIDE 37.5 MG/1
1 CAPSULE, EXTENDED RELEASE ORAL
Refills: 0 | DISCHARGE

## 2025-07-30 RX ORDER — BRIMONIDINE TARTRATE 1.5 MG/ML
1 SOLUTION/ DROPS OPHTHALMIC
Refills: 0 | Status: DISCONTINUED | OUTPATIENT
Start: 2025-07-30 | End: 2025-08-01

## 2025-07-30 RX ORDER — DESMOPRESSIN ACETATE 4 UG/ML
0.1 INJECTION INTRAVENOUS AT BEDTIME
Refills: 0 | Status: DISCONTINUED | OUTPATIENT
Start: 2025-07-30 | End: 2025-08-01

## 2025-07-30 RX ORDER — FINASTERIDE 1 MG/1
5 TABLET, FILM COATED ORAL DAILY
Refills: 0 | Status: DISCONTINUED | OUTPATIENT
Start: 2025-07-30 | End: 2025-08-01

## 2025-07-30 RX ORDER — CLOPIDOGREL BISULFATE 75 MG/1
75 TABLET, FILM COATED ORAL DAILY
Refills: 0 | Status: DISCONTINUED | OUTPATIENT
Start: 2025-07-30 | End: 2025-08-01

## 2025-07-30 RX ORDER — DORZOLAMIDE 20 MG/ML
1 SOLUTION/ DROPS OPHTHALMIC
Refills: 0 | Status: DISCONTINUED | OUTPATIENT
Start: 2025-07-30 | End: 2025-08-01

## 2025-07-30 RX ORDER — SENNA 187 MG
2 TABLET ORAL AT BEDTIME
Refills: 0 | Status: DISCONTINUED | OUTPATIENT
Start: 2025-07-30 | End: 2025-08-01

## 2025-07-30 RX ORDER — MEMANTINE HYDROCHLORIDE 21 MG/1
10 CAPSULE, EXTENDED RELEASE ORAL
Refills: 0 | Status: DISCONTINUED | OUTPATIENT
Start: 2025-07-30 | End: 2025-08-01

## 2025-07-30 RX ORDER — POLYETHYLENE GLYCOL 3350 17 G/17G
17 POWDER, FOR SOLUTION ORAL DAILY
Refills: 0 | Status: DISCONTINUED | OUTPATIENT
Start: 2025-07-30 | End: 2025-08-01

## 2025-07-30 RX ADMIN — TAMSULOSIN HYDROCHLORIDE 0.4 MILLIGRAM(S): 0.4 CAPSULE ORAL at 21:22

## 2025-07-30 RX ADMIN — Medication 400 MILLIGRAM(S): at 13:06

## 2025-07-30 RX ADMIN — BRIMONIDINE TARTRATE 1 DROP(S): 1.5 SOLUTION/ DROPS OPHTHALMIC at 21:25

## 2025-07-30 RX ADMIN — ATORVASTATIN CALCIUM 10 MILLIGRAM(S): 80 TABLET, FILM COATED ORAL at 21:22

## 2025-07-30 RX ADMIN — Medication 500 MILLILITER(S): at 09:51

## 2025-07-30 RX ADMIN — Medication 2 TABLET(S): at 21:25

## 2025-07-30 RX ADMIN — MEMANTINE HYDROCHLORIDE 10 MILLIGRAM(S): 21 CAPSULE, EXTENDED RELEASE ORAL at 21:23

## 2025-07-30 RX ADMIN — DESMOPRESSIN ACETATE 0.1 MILLIGRAM(S): 4 INJECTION INTRAVENOUS at 21:24

## 2025-07-30 RX ADMIN — DORZOLAMIDE 1 DROP(S): 20 SOLUTION/ DROPS OPHTHALMIC at 21:25

## 2025-07-30 RX ADMIN — Medication 250 MILLIGRAM(S): at 21:24

## 2025-07-31 PROCEDURE — 99233 SBSQ HOSP IP/OBS HIGH 50: CPT

## 2025-07-31 RX ADMIN — FINASTERIDE 5 MILLIGRAM(S): 1 TABLET, FILM COATED ORAL at 10:57

## 2025-07-31 RX ADMIN — MEMANTINE HYDROCHLORIDE 10 MILLIGRAM(S): 21 CAPSULE, EXTENDED RELEASE ORAL at 21:18

## 2025-07-31 RX ADMIN — TAMSULOSIN HYDROCHLORIDE 0.4 MILLIGRAM(S): 0.4 CAPSULE ORAL at 21:17

## 2025-07-31 RX ADMIN — Medication 250 MILLIGRAM(S): at 21:18

## 2025-07-31 RX ADMIN — Medication 2 TABLET(S): at 21:18

## 2025-07-31 RX ADMIN — Medication 40 MILLIGRAM(S): at 06:28

## 2025-07-31 RX ADMIN — DESMOPRESSIN ACETATE 0.1 MILLIGRAM(S): 4 INJECTION INTRAVENOUS at 21:18

## 2025-07-31 RX ADMIN — Medication 250 MILLIGRAM(S): at 10:57

## 2025-07-31 RX ADMIN — DORZOLAMIDE 1 DROP(S): 20 SOLUTION/ DROPS OPHTHALMIC at 21:21

## 2025-07-31 RX ADMIN — Medication 81 MILLIGRAM(S): at 10:56

## 2025-07-31 RX ADMIN — AMLODIPINE BESYLATE 5 MILLIGRAM(S): 10 TABLET ORAL at 10:56

## 2025-07-31 RX ADMIN — DORZOLAMIDE 1 DROP(S): 20 SOLUTION/ DROPS OPHTHALMIC at 10:57

## 2025-07-31 RX ADMIN — BRIMONIDINE TARTRATE 1 DROP(S): 1.5 SOLUTION/ DROPS OPHTHALMIC at 10:57

## 2025-07-31 RX ADMIN — ATORVASTATIN CALCIUM 10 MILLIGRAM(S): 80 TABLET, FILM COATED ORAL at 21:18

## 2025-07-31 RX ADMIN — BRIMONIDINE TARTRATE 1 DROP(S): 1.5 SOLUTION/ DROPS OPHTHALMIC at 21:21

## 2025-07-31 RX ADMIN — CLOPIDOGREL BISULFATE 75 MILLIGRAM(S): 75 TABLET, FILM COATED ORAL at 10:57

## 2025-07-31 RX ADMIN — MEMANTINE HYDROCHLORIDE 10 MILLIGRAM(S): 21 CAPSULE, EXTENDED RELEASE ORAL at 10:57

## 2025-07-31 RX ADMIN — VENLAFAXINE HYDROCHLORIDE 75 MILLIGRAM(S): 37.5 CAPSULE, EXTENDED RELEASE ORAL at 10:57

## 2025-08-01 ENCOUNTER — TRANSCRIPTION ENCOUNTER (OUTPATIENT)
Age: 85
End: 2025-08-01

## 2025-08-01 VITALS
OXYGEN SATURATION: 96 % | RESPIRATION RATE: 18 BRPM | SYSTOLIC BLOOD PRESSURE: 143 MMHG | DIASTOLIC BLOOD PRESSURE: 75 MMHG | TEMPERATURE: 98 F | HEART RATE: 62 BPM

## 2025-08-01 PROCEDURE — 99239 HOSP IP/OBS DSCHRG MGMT >30: CPT

## 2025-08-01 RX ORDER — DONEPEZIL HYDROCHLORIDE 5 MG/1
10 TABLET ORAL
Qty: 0 | Refills: 0 | DISCHARGE
Start: 2025-08-01

## 2025-08-01 RX ADMIN — Medication 250 MILLIGRAM(S): at 09:43

## 2025-08-01 RX ADMIN — DORZOLAMIDE 1 DROP(S): 20 SOLUTION/ DROPS OPHTHALMIC at 09:49

## 2025-08-01 RX ADMIN — VENLAFAXINE HYDROCHLORIDE 75 MILLIGRAM(S): 37.5 CAPSULE, EXTENDED RELEASE ORAL at 09:44

## 2025-08-01 RX ADMIN — FINASTERIDE 5 MILLIGRAM(S): 1 TABLET, FILM COATED ORAL at 09:44

## 2025-08-01 RX ADMIN — CLOPIDOGREL BISULFATE 75 MILLIGRAM(S): 75 TABLET, FILM COATED ORAL at 09:44

## 2025-08-01 RX ADMIN — MEMANTINE HYDROCHLORIDE 10 MILLIGRAM(S): 21 CAPSULE, EXTENDED RELEASE ORAL at 09:44

## 2025-08-01 RX ADMIN — Medication 40 MILLIGRAM(S): at 06:44

## 2025-08-01 RX ADMIN — AMLODIPINE BESYLATE 5 MILLIGRAM(S): 10 TABLET ORAL at 09:44

## 2025-08-01 RX ADMIN — BRIMONIDINE TARTRATE 1 DROP(S): 1.5 SOLUTION/ DROPS OPHTHALMIC at 09:50

## 2025-08-01 RX ADMIN — Medication 81 MILLIGRAM(S): at 09:44

## 2025-08-19 ENCOUNTER — EMERGENCY (EMERGENCY)
Facility: HOSPITAL | Age: 85
LOS: 0 days | Discharge: HOME | End: 2025-08-19
Attending: EMERGENCY MEDICINE
Payer: MEDICARE

## 2025-08-19 VITALS
HEART RATE: 64 BPM | OXYGEN SATURATION: 98 % | RESPIRATION RATE: 20 BRPM | DIASTOLIC BLOOD PRESSURE: 64 MMHG | SYSTOLIC BLOOD PRESSURE: 134 MMHG | TEMPERATURE: 98 F

## 2025-08-19 VITALS
TEMPERATURE: 98 F | HEART RATE: 72 BPM | RESPIRATION RATE: 16 BRPM | SYSTOLIC BLOOD PRESSURE: 122 MMHG | OXYGEN SATURATION: 97 % | HEIGHT: 71 IN | DIASTOLIC BLOOD PRESSURE: 80 MMHG

## 2025-08-19 DIAGNOSIS — Z90.89 ACQUIRED ABSENCE OF OTHER ORGANS: Chronic | ICD-10-CM

## 2025-08-19 DIAGNOSIS — R04.0 EPISTAXIS: ICD-10-CM

## 2025-08-19 DIAGNOSIS — Z98.890 OTHER SPECIFIED POSTPROCEDURAL STATES: Chronic | ICD-10-CM

## 2025-08-19 PROCEDURE — 30901 CONTROL OF NOSEBLEED: CPT

## 2025-08-19 PROCEDURE — 99283 EMERGENCY DEPT VISIT LOW MDM: CPT | Mod: 25

## 2025-08-19 PROCEDURE — 99284 EMERGENCY DEPT VISIT MOD MDM: CPT

## 2025-08-19 RX ORDER — TRANEXAMIC ACID 1000 MG/10
5 AMPUL (ML) INTRAVENOUS ONCE
Refills: 0 | Status: COMPLETED | OUTPATIENT
Start: 2025-08-19 | End: 2025-08-19

## 2025-08-19 RX ORDER — AMOXICILLIN AND CLAVULANATE POTASSIUM 500; 125 MG/1; MG/1
1 TABLET, FILM COATED ORAL
Qty: 10 | Refills: 0
Start: 2025-08-19 | End: 2025-08-23

## 2025-08-19 RX ADMIN — Medication 5 MILLILITER(S): at 05:30

## (undated) DEVICE — SAW BLADE STRYKER SAGITTAL DUAL CUT 18X90X1.19MM

## (undated) DEVICE — DRAPE STICKY U BLUE 60 X 84"

## (undated) DEVICE — DRAPE IOBAN 33" X 23"

## (undated) DEVICE — DRAPE TOWEL BLUE 17" X 24"

## (undated) DEVICE — GLV 8 PROTEXIS (BLUE)

## (undated) DEVICE — SUT VICRYL 0 36" CT-1 UNDYED

## (undated) DEVICE — ELCTR S&N WAND WEREWOLF FASTSEAL 6MM

## (undated) DEVICE — SUT STRATAFIX SPIRAL PDS PLUS 2-0 45CM CT-1

## (undated) DEVICE — GLV 7.5 PROTEXIS ORTHO (BROWN)

## (undated) DEVICE — NDL HYPO SAFE 18G X 1.5" (PINK)

## (undated) DEVICE — GOWN XL

## (undated) DEVICE — TAPE SILK 1"

## (undated) DEVICE — DRAPE SHOWER CURTAIN ISOLATION

## (undated) DEVICE — GLV 7.5 PROTEXIS (WHITE)

## (undated) DEVICE — FRAZIER SUCTION TIP 18FR

## (undated) DEVICE — DRSG WEBRIL 6"

## (undated) DEVICE — ELCTR STRYKER NEPTUNE SMOKE EVACUATION PENCIL (GREEN)

## (undated) DEVICE — POSITIONER HANA PATIENT CARE KIT POSITION SUPINE

## (undated) DEVICE — FRA-ESU BOVIE FORCE TRIAD T0E42286E: Type: DURABLE MEDICAL EQUIPMENT

## (undated) DEVICE — HOOD T5 PEELAWAY

## (undated) DEVICE — ELCTR BOVIE TIP BLADE INSULATED 6.5" EDGE

## (undated) DEVICE — VENODYNE/SCD SLEEVE CALF MEDIUM

## (undated) DEVICE — SUT ETHIBOND 5 4-30" V-37

## (undated) DEVICE — PACK TOTAL HIP

## (undated) DEVICE — Device

## (undated) DEVICE — SYR LUER LOK 20CC

## (undated) DEVICE — WOUND IRR IRRISEPT W 0.5 CHG

## (undated) DEVICE — DRAPE 3/4 SHEET W REINFORCEMENT 56X77"

## (undated) DEVICE — SUT STRATAFIX SPIRAL MONOCRYL PLUS 4-0 45CM PS-2 UNDYED

## (undated) DEVICE — SUT STRATAFIX SYMMETRIC PDS PLUS 1 18" CTX VIOLET

## (undated) DEVICE — DRSG PICO NPWT 4X12"

## (undated) DEVICE — DRAPE C ARM 41X140"

## (undated) DEVICE — WARMING BLANKET UPPER ADULT